# Patient Record
Sex: MALE | Race: WHITE | HISPANIC OR LATINO
[De-identification: names, ages, dates, MRNs, and addresses within clinical notes are randomized per-mention and may not be internally consistent; named-entity substitution may affect disease eponyms.]

---

## 2021-10-19 ENCOUNTER — APPOINTMENT (OUTPATIENT)
Dept: ORTHOPEDIC SURGERY | Facility: CLINIC | Age: 66
End: 2021-10-19
Payer: MEDICARE

## 2021-10-19 ENCOUNTER — NON-APPOINTMENT (OUTPATIENT)
Age: 66
End: 2021-10-19

## 2021-10-19 VITALS
HEART RATE: 56 BPM | SYSTOLIC BLOOD PRESSURE: 137 MMHG | BODY MASS INDEX: 44.95 KG/M2 | WEIGHT: 303.5 LBS | DIASTOLIC BLOOD PRESSURE: 74 MMHG | HEIGHT: 69 IN

## 2021-10-19 DIAGNOSIS — M25.569 PAIN IN UNSPECIFIED KNEE: ICD-10-CM

## 2021-10-19 DIAGNOSIS — Z82.79 FAMILY HISTORY OF OTHER CONGENITAL MALFORMATIONS, DEFORMATIONS AND CHROMOSOMAL ABNORMALITIES: ICD-10-CM

## 2021-10-19 DIAGNOSIS — Z82.49 FAMILY HISTORY OF ISCHEMIC HEART DISEASE AND OTHER DISEASES OF THE CIRCULATORY SYSTEM: ICD-10-CM

## 2021-10-19 DIAGNOSIS — M17.11 UNILATERAL PRIMARY OSTEOARTHRITIS, RIGHT KNEE: ICD-10-CM

## 2021-10-19 DIAGNOSIS — Z83.3 FAMILY HISTORY OF DIABETES MELLITUS: ICD-10-CM

## 2021-10-19 PROCEDURE — 99204 OFFICE O/P NEW MOD 45 MIN: CPT

## 2021-10-19 PROCEDURE — 72170 X-RAY EXAM OF PELVIS: CPT | Mod: 59

## 2021-10-19 PROCEDURE — 73562 X-RAY EXAM OF KNEE 3: CPT | Mod: 50

## 2022-01-12 ENCOUNTER — OUTPATIENT (OUTPATIENT)
Dept: OUTPATIENT SERVICES | Facility: HOSPITAL | Age: 67
LOS: 1 days | End: 2022-01-12
Payer: MEDICARE

## 2022-01-12 VITALS
HEIGHT: 69 IN | RESPIRATION RATE: 14 BRPM | TEMPERATURE: 98 F | SYSTOLIC BLOOD PRESSURE: 130 MMHG | WEIGHT: 304.9 LBS | OXYGEN SATURATION: 99 % | DIASTOLIC BLOOD PRESSURE: 88 MMHG | HEART RATE: 60 BPM

## 2022-01-12 DIAGNOSIS — M19.90 UNSPECIFIED OSTEOARTHRITIS, UNSPECIFIED SITE: ICD-10-CM

## 2022-01-12 DIAGNOSIS — M17.11 UNILATERAL PRIMARY OSTEOARTHRITIS, RIGHT KNEE: ICD-10-CM

## 2022-01-12 DIAGNOSIS — Z98.890 OTHER SPECIFIED POSTPROCEDURAL STATES: Chronic | ICD-10-CM

## 2022-01-12 LAB
A1C WITH ESTIMATED AVERAGE GLUCOSE RESULT: 5.9 % — HIGH (ref 4–5.6)
ALBUMIN SERPL ELPH-MCNC: 3.5 G/DL — SIGNIFICANT CHANGE UP (ref 3.3–5)
ALP SERPL-CCNC: 96 U/L — SIGNIFICANT CHANGE UP (ref 30–120)
ALT FLD-CCNC: 25 U/L DA — SIGNIFICANT CHANGE UP (ref 10–60)
ANION GAP SERPL CALC-SCNC: 7 MMOL/L — SIGNIFICANT CHANGE UP (ref 5–17)
APTT BLD: 39 SEC — HIGH (ref 27.5–35.5)
AST SERPL-CCNC: 24 U/L — SIGNIFICANT CHANGE UP (ref 10–40)
BILIRUB SERPL-MCNC: 0.4 MG/DL — SIGNIFICANT CHANGE UP (ref 0.2–1.2)
BLD GP AB SCN SERPL QL: SIGNIFICANT CHANGE UP
BUN SERPL-MCNC: 10 MG/DL — SIGNIFICANT CHANGE UP (ref 7–23)
CALCIUM SERPL-MCNC: 9 MG/DL — SIGNIFICANT CHANGE UP (ref 8.4–10.5)
CHLORIDE SERPL-SCNC: 105 MMOL/L — SIGNIFICANT CHANGE UP (ref 96–108)
CO2 SERPL-SCNC: 29 MMOL/L — SIGNIFICANT CHANGE UP (ref 22–31)
CREAT SERPL-MCNC: 0.92 MG/DL — SIGNIFICANT CHANGE UP (ref 0.5–1.3)
ESTIMATED AVERAGE GLUCOSE: 123 MG/DL — HIGH (ref 68–114)
GLUCOSE SERPL-MCNC: 95 MG/DL — SIGNIFICANT CHANGE UP (ref 70–99)
HCT VFR BLD CALC: 44.1 % — SIGNIFICANT CHANGE UP (ref 39–50)
HGB BLD-MCNC: 14 G/DL — SIGNIFICANT CHANGE UP (ref 13–17)
INR BLD: 1.08 RATIO — SIGNIFICANT CHANGE UP (ref 0.88–1.16)
MCHC RBC-ENTMCNC: 31.1 PG — SIGNIFICANT CHANGE UP (ref 27–34)
MCHC RBC-ENTMCNC: 31.7 GM/DL — LOW (ref 32–36)
MCV RBC AUTO: 98 FL — SIGNIFICANT CHANGE UP (ref 80–100)
MRSA PCR RESULT.: SIGNIFICANT CHANGE UP
NRBC # BLD: 0 /100 WBCS — SIGNIFICANT CHANGE UP (ref 0–0)
PLATELET # BLD AUTO: 262 K/UL — SIGNIFICANT CHANGE UP (ref 150–400)
POTASSIUM SERPL-MCNC: 4.7 MMOL/L — SIGNIFICANT CHANGE UP (ref 3.5–5.3)
POTASSIUM SERPL-SCNC: 4.7 MMOL/L — SIGNIFICANT CHANGE UP (ref 3.5–5.3)
PROT SERPL-MCNC: 7.7 G/DL — SIGNIFICANT CHANGE UP (ref 6–8.3)
PROTHROM AB SERPL-ACNC: 13 SEC — SIGNIFICANT CHANGE UP (ref 10.6–13.6)
RBC # BLD: 4.5 M/UL — SIGNIFICANT CHANGE UP (ref 4.2–5.8)
RBC # FLD: 12.4 % — SIGNIFICANT CHANGE UP (ref 10.3–14.5)
S AUREUS DNA NOSE QL NAA+PROBE: SIGNIFICANT CHANGE UP
SODIUM SERPL-SCNC: 141 MMOL/L — SIGNIFICANT CHANGE UP (ref 135–145)
WBC # BLD: 10.09 K/UL — SIGNIFICANT CHANGE UP (ref 3.8–10.5)
WBC # FLD AUTO: 10.09 K/UL — SIGNIFICANT CHANGE UP (ref 3.8–10.5)

## 2022-01-12 PROCEDURE — 93005 ELECTROCARDIOGRAM TRACING: CPT

## 2022-01-12 PROCEDURE — 93010 ELECTROCARDIOGRAM REPORT: CPT

## 2022-01-12 PROCEDURE — G0463: CPT

## 2022-01-12 NOTE — H&P PST ADULT - HISTORY OF PRESENT ILLNESS
This is a 67 y/o male whoi presents with complaint of right knee pain for the past 5 yearReports constant achy pain and pain get worse with walking , climbing up and down stairs , standing long time with pain scale 8/10 / X ray showed severe osteoarthritis . scheduled for right knee replacement on 1/24/22 This is a 67 y/o male who presents with complaint of right knee pain for the past 5 year. Reports constant achy pain and pain get worse with walking ,climbing up and down stairs , standing long time with pain scale 8/10 / X ray showed severe osteoarthritis . scheduled for right knee replacement on 1/24/22

## 2022-01-12 NOTE — H&P PST ADULT - NSICDXFAMILYHX_GEN_ALL_CORE_FT
FAMILY HISTORY:  Father  Still living? No  Family history of heart attack, Age at diagnosis: Age Unknown  FH: congestive heart failure, Age at diagnosis: Age Unknown    Mother  Still living? Yes, Estimated age: Age Unknown  FH: HTN (hypertension), Age at diagnosis: Age Unknown  FH: type 2 diabetes mellitus, Age at diagnosis: Age Unknown

## 2022-01-12 NOTE — H&P PST ADULT - PROBLEM SELECTOR PLAN 1
scheduled for right knee replacement on 1/24/22  medical and cardiac clearances   Pre op instructions   COVID test on 1/24/22 at 10 am

## 2022-01-12 NOTE — H&P PST ADULT - OPHTHALMOLOGIC
Physical Therapy     Referred by: Kristi Sawyer MD; Medical Diagnosis (from order):    Diagnosis Information      Diagnosis    434.91 (ICD-9-CM) - I63.50 (ICD-10-CM) - Right pontine stroke (CMS/HCC)    728.87 (ICD-9-CM) - R53.1 (ICD-10-CM) - Left-sided weakness    719.46, 338.29 (ICD-9-CM) - M25.562, G89.29 (ICD-10-CM) - Chronic pain of left knee                Daily Treatment Note    Visit:  3   Patient alert and oriented X3.    SUBJECTIVE                                                                                                               Via virtual , pt reports completing HEP   Pain / Symptoms:  Pain rating (out of 10): Current: 0 ; Best: 0; Worst: 6  Location: L knee  Quality / Description: ache.  Alleviating Factors: change in position, rest, over-the-counter medication.     OBJECTIVE                                                                                                                        TREATMENT                                                                                                                  Therapeutic Exercise:  NuStep x 7 min B UE/LE seat 3 Level 1    Supine heel slides,hip abduction,SLR x 10 reps each    -Alternating taps to colored dots on ground with uni UE support, x 15 reps each direction with LE, min verbal cuing for upright posture  -Tap ups to 6\" step with  Bilateral handrails x 2/15 reps each,forward/lateral cga for safety, mod cuing for for LE sequence  -Step ups to 6\" step with Bilateral handrails x 2/10 reps, cga provided for safety    Not performed today:  -seated heel raises,marching,laq's x 15 reps each    Therapeutic Activity:  -Sidestepping R/L x 6 feet each direction x 6 reps, min cuing for upright posture and increased step length  -Standing hip flexion x 15 reps with BUE support, min cuing for technique and posture    Step ups to Airex balance pad with BUE support x 15 reps, forward/lateral, min verbal cuing for foot placement and  technique    Gait Trainin feet x 2 rollator supervision for safety    Not performed today:  200 feet no device sba for safety, cga during turns due to LOB, min verbal cuing for increased heel strike     Skilled input: verbal instruction/cues, tactile instruction/cues and posture correction    Writer verbally educated and received verbal consent for hand placement, positioning of patient, and techniques to be performed today from patient for clothing adjustments for techniques, hand placement and palpation for techniques and therapist position for techniques as described above and how they are pertinent to the patient's plan of care.    Home Exercise Program/Education Materials: Access Code: [email or web address]  URL: [email or web address]  Date: 2021  Prepared by: AUDELIA BEAR    Exercises  •Seated Heel Raise - 1 x daily - 7 x weekly - 3 sets - 10 reps  •Seated March - 1 x daily - 7 x weekly - 3 sets - 10 reps  •Seated Long Arc Quad - 1 x daily - 7 x weekly - 3 sets - 10 reps    Access Code: [email or web address]  URL: [email or web address]  Date: 2021  Prepared by: AUDELIA FUENTESGER    Exercises  •Supine Heel Slide - 1 x daily - 7 x weekly - 1 sets - 10 reps  •Supine Hip Abduction - 1 x daily - 7 x weekly - 1 sets - 10 reps  •Supine Active Straight Leg Raise - 1 x daily - 7 x weekly - 1 sets - 10 reps         ASSESSMENT                                                                                                             Pt demonstrated improved endurance during planned interventions today. Will progress pt with dynamic balance and LE strengthening next session. Educated pt and pts mother on additional HEP exercises.  Pain/symptoms after session (out of 10): 4    Patient Education:   Results of above outlined education: Verbalizes understanding and Needs reinforcement      PLAN                                                                                                                            Suggestions for next session as indicated: Progress per plan of care         Therapy procedure time and total treatment time can be found documented on the Time Entry flowsheet   details…

## 2022-01-12 NOTE — H&P PST ADULT - MUSCULOSKELETAL
details… detailed exam right knee/no calf tenderness/decreased ROM due to pain/joint swelling/diminished strength

## 2022-01-12 NOTE — H&P PST ADULT - NSICDXPASTMEDICALHX_GEN_ALL_CORE_FT
PAST MEDICAL HISTORY:  2019 novel coronavirus disease (COVID-19) 7/2021    HLD (hyperlipidemia)     HTN (hypertension)     ALBERTO on CPAP     Osteoarthritis right knee

## 2022-01-18 PROBLEM — E78.5 HYPERLIPIDEMIA, UNSPECIFIED: Chronic | Status: ACTIVE | Noted: 2022-01-12

## 2022-01-18 PROBLEM — I10 ESSENTIAL (PRIMARY) HYPERTENSION: Chronic | Status: ACTIVE | Noted: 2022-01-12

## 2022-01-18 PROBLEM — M19.90 UNSPECIFIED OSTEOARTHRITIS, UNSPECIFIED SITE: Chronic | Status: ACTIVE | Noted: 2022-01-12

## 2022-01-18 PROBLEM — G47.33 OBSTRUCTIVE SLEEP APNEA (ADULT) (PEDIATRIC): Chronic | Status: ACTIVE | Noted: 2022-01-12

## 2022-01-21 ENCOUNTER — OUTPATIENT (OUTPATIENT)
Dept: OUTPATIENT SERVICES | Facility: HOSPITAL | Age: 67
LOS: 1 days | End: 2022-01-21
Payer: MEDICARE

## 2022-01-21 DIAGNOSIS — Z98.890 OTHER SPECIFIED POSTPROCEDURAL STATES: Chronic | ICD-10-CM

## 2022-01-21 DIAGNOSIS — Z20.828 CONTACT WITH AND (SUSPECTED) EXPOSURE TO OTHER VIRAL COMMUNICABLE DISEASES: ICD-10-CM

## 2022-01-21 LAB — SARS-COV-2 RNA SPEC QL NAA+PROBE: SIGNIFICANT CHANGE UP

## 2022-01-21 PROCEDURE — U0005: CPT

## 2022-01-21 PROCEDURE — U0003: CPT

## 2022-01-24 ENCOUNTER — APPOINTMENT (OUTPATIENT)
Dept: ORTHOPEDIC SURGERY | Facility: HOSPITAL | Age: 67
End: 2022-01-24

## 2022-02-07 ENCOUNTER — APPOINTMENT (OUTPATIENT)
Dept: ORTHOPEDIC SURGERY | Facility: CLINIC | Age: 67
End: 2022-02-07

## 2022-02-11 ENCOUNTER — OUTPATIENT (OUTPATIENT)
Dept: OUTPATIENT SERVICES | Facility: HOSPITAL | Age: 67
LOS: 1 days | End: 2022-02-11
Payer: MEDICARE

## 2022-02-11 VITALS
TEMPERATURE: 97 F | RESPIRATION RATE: 16 BRPM | HEART RATE: 62 BPM | OXYGEN SATURATION: 97 % | WEIGHT: 291.23 LBS | SYSTOLIC BLOOD PRESSURE: 168 MMHG | DIASTOLIC BLOOD PRESSURE: 78 MMHG | HEIGHT: 68.5 IN

## 2022-02-11 DIAGNOSIS — M17.11 UNILATERAL PRIMARY OSTEOARTHRITIS, RIGHT KNEE: ICD-10-CM

## 2022-02-11 DIAGNOSIS — Z98.890 OTHER SPECIFIED POSTPROCEDURAL STATES: Chronic | ICD-10-CM

## 2022-02-11 LAB
ALBUMIN SERPL ELPH-MCNC: 3.6 G/DL — SIGNIFICANT CHANGE UP (ref 3.3–5)
ALP SERPL-CCNC: 105 U/L — SIGNIFICANT CHANGE UP (ref 30–120)
ALT FLD-CCNC: 40 U/L DA — SIGNIFICANT CHANGE UP (ref 10–60)
ANION GAP SERPL CALC-SCNC: 7 MMOL/L — SIGNIFICANT CHANGE UP (ref 5–17)
APTT BLD: 37 SEC — HIGH (ref 27.5–35.5)
AST SERPL-CCNC: 28 U/L — SIGNIFICANT CHANGE UP (ref 10–40)
BILIRUB SERPL-MCNC: 0.4 MG/DL — SIGNIFICANT CHANGE UP (ref 0.2–1.2)
BLD GP AB SCN SERPL QL: SIGNIFICANT CHANGE UP
BUN SERPL-MCNC: 13 MG/DL — SIGNIFICANT CHANGE UP (ref 7–23)
CALCIUM SERPL-MCNC: 8.6 MG/DL — SIGNIFICANT CHANGE UP (ref 8.4–10.5)
CHLORIDE SERPL-SCNC: 104 MMOL/L — SIGNIFICANT CHANGE UP (ref 96–108)
CO2 SERPL-SCNC: 28 MMOL/L — SIGNIFICANT CHANGE UP (ref 22–31)
CREAT SERPL-MCNC: 1.03 MG/DL — SIGNIFICANT CHANGE UP (ref 0.5–1.3)
GLUCOSE SERPL-MCNC: 100 MG/DL — HIGH (ref 70–99)
HCT VFR BLD CALC: 44 % — SIGNIFICANT CHANGE UP (ref 39–50)
HGB BLD-MCNC: 14.4 G/DL — SIGNIFICANT CHANGE UP (ref 13–17)
INR BLD: 1.06 RATIO — SIGNIFICANT CHANGE UP (ref 0.88–1.16)
MCHC RBC-ENTMCNC: 31.9 PG — SIGNIFICANT CHANGE UP (ref 27–34)
MCHC RBC-ENTMCNC: 32.7 GM/DL — SIGNIFICANT CHANGE UP (ref 32–36)
MCV RBC AUTO: 97.6 FL — SIGNIFICANT CHANGE UP (ref 80–100)
MRSA PCR RESULT.: SIGNIFICANT CHANGE UP
NRBC # BLD: 0 /100 WBCS — SIGNIFICANT CHANGE UP (ref 0–0)
PLATELET # BLD AUTO: 272 K/UL — SIGNIFICANT CHANGE UP (ref 150–400)
POTASSIUM SERPL-MCNC: 4.9 MMOL/L — SIGNIFICANT CHANGE UP (ref 3.5–5.3)
POTASSIUM SERPL-SCNC: 4.9 MMOL/L — SIGNIFICANT CHANGE UP (ref 3.5–5.3)
PROT SERPL-MCNC: 7.9 G/DL — SIGNIFICANT CHANGE UP (ref 6–8.3)
PROTHROM AB SERPL-ACNC: 12.8 SEC — SIGNIFICANT CHANGE UP (ref 10.6–13.6)
RBC # BLD: 4.51 M/UL — SIGNIFICANT CHANGE UP (ref 4.2–5.8)
RBC # FLD: 12.5 % — SIGNIFICANT CHANGE UP (ref 10.3–14.5)
S AUREUS DNA NOSE QL NAA+PROBE: SIGNIFICANT CHANGE UP
SODIUM SERPL-SCNC: 139 MMOL/L — SIGNIFICANT CHANGE UP (ref 135–145)
WBC # BLD: 9.12 K/UL — SIGNIFICANT CHANGE UP (ref 3.8–10.5)
WBC # FLD AUTO: 9.12 K/UL — SIGNIFICANT CHANGE UP (ref 3.8–10.5)

## 2022-02-11 PROCEDURE — G0463: CPT

## 2022-02-11 RX ORDER — ATORVASTATIN CALCIUM 80 MG/1
1 TABLET, FILM COATED ORAL
Qty: 0 | Refills: 0 | DISCHARGE

## 2022-02-11 RX ORDER — NEBIVOLOL HYDROCHLORIDE 5 MG/1
1 TABLET ORAL
Qty: 0 | Refills: 0 | DISCHARGE

## 2022-02-11 NOTE — H&P PST ADULT - HISTORY OF PRESENT ILLNESS
This is a 65 y/o male who presents with complaints of right knee pain for the past 5 year. Reports constant achy pain and pain get worse with walking ,climbing up and down stairs. He received cortisone injections and "gel shots" in the past with no relief. Currently pain is 3/10 at rest and increased to 8-9/10 with activity. Denies using any current pain relief measures.

## 2022-02-11 NOTE — H&P PST ADULT - NSICDXPASTMEDICALHX_GEN_ALL_CORE_FT
PAST MEDICAL HISTORY:  2019 novel coronavirus disease (COVID-19) November 2021, mild symptoms and no hospitalization    Childhood asthma     COVID-19 vaccine series completed     GERD (gastroesophageal reflux disease) diet controlled    HLD (hyperlipidemia)     HTN (hypertension)     ALBERTO on CPAP     Osteoarthritis right knee

## 2022-02-11 NOTE — H&P PST ADULT - MUSCULOSKELETAL
right knee/no calf tenderness/decreased ROM due to pain/joint swelling/diminished strength details… detailed exam right knee/no calf tenderness/decreased ROM/decreased ROM due to pain/joint swelling/diminished strength

## 2022-02-11 NOTE — H&P PST ADULT - FALL HARM RISK - UNIVERSAL INTERVENTIONS
Bed in lowest position, wheels locked, appropriate side rails in place/Call bell, personal items and telephone in reach/Non-slip footwear when patient is out of bed/Shaw to call system/Physically safe environment - no spills, clutter or unnecessary equipment/Purposeful Proactive Rounding/Room/bathroom lighting operational, light cord in reach

## 2022-02-11 NOTE — H&P PST ADULT - RS GEN PE MLT RESP DETAILS PC
breath sounds equal/respirations non-labored/clear to auscultation bilaterally/no rhonchi breath sounds equal/respirations non-labored/clear to auscultation bilaterally/no rales/no rhonchi/no wheezes

## 2022-02-11 NOTE — H&P PST ADULT - PROBLEM SELECTOR PLAN 1
scheduled for right knee replacement on 1/24/22  medical and cardiac clearances   Pre op instructions   COVID test on 1/24/22 at 10 am right TKR. medical, cardiac and pulmonary clearances requested. obtain stress and echo reports. ERAS and surgical wash instructions reviewed and verbalized understanding. covid PCR appt. confirmed for 2/15/22 at 0900

## 2022-02-14 DIAGNOSIS — R79.1 ABNORMAL COAGULATION PROFILE: ICD-10-CM

## 2022-02-15 ENCOUNTER — OUTPATIENT (OUTPATIENT)
Dept: OUTPATIENT SERVICES | Facility: HOSPITAL | Age: 67
LOS: 1 days | End: 2022-02-15

## 2022-02-15 DIAGNOSIS — Z20.828 CONTACT WITH AND (SUSPECTED) EXPOSURE TO OTHER VIRAL COMMUNICABLE DISEASES: ICD-10-CM

## 2022-02-15 DIAGNOSIS — Z98.890 OTHER SPECIFIED POSTPROCEDURAL STATES: Chronic | ICD-10-CM

## 2022-02-15 LAB — SARS-COV-2 RNA SPEC QL NAA+PROBE: SIGNIFICANT CHANGE UP

## 2022-02-17 ENCOUNTER — INPATIENT (INPATIENT)
Facility: HOSPITAL | Age: 67
LOS: 0 days | Discharge: HOME HEALTH | DRG: 470 | End: 2022-02-18
Attending: ORTHOPAEDIC SURGERY | Admitting: ORTHOPAEDIC SURGERY
Payer: MEDICARE

## 2022-02-17 ENCOUNTER — RESULT REVIEW (OUTPATIENT)
Age: 67
End: 2022-02-17

## 2022-02-17 ENCOUNTER — APPOINTMENT (OUTPATIENT)
Dept: ORTHOPEDIC SURGERY | Facility: HOSPITAL | Age: 67
End: 2022-02-17

## 2022-02-17 ENCOUNTER — TRANSCRIPTION ENCOUNTER (OUTPATIENT)
Age: 67
End: 2022-02-17

## 2022-02-17 VITALS
RESPIRATION RATE: 14 BRPM | WEIGHT: 292.55 LBS | TEMPERATURE: 98 F | DIASTOLIC BLOOD PRESSURE: 49 MMHG | HEART RATE: 63 BPM | OXYGEN SATURATION: 100 % | HEIGHT: 69 IN | SYSTOLIC BLOOD PRESSURE: 139 MMHG

## 2022-02-17 DIAGNOSIS — Z98.890 OTHER SPECIFIED POSTPROCEDURAL STATES: Chronic | ICD-10-CM

## 2022-02-17 DIAGNOSIS — M17.11 UNILATERAL PRIMARY OSTEOARTHRITIS, RIGHT KNEE: ICD-10-CM

## 2022-02-17 PROCEDURE — 88305 TISSUE EXAM BY PATHOLOGIST: CPT | Mod: 26

## 2022-02-17 PROCEDURE — 27447 TOTAL KNEE ARTHROPLASTY: CPT | Mod: RT

## 2022-02-17 PROCEDURE — 88311 DECALCIFY TISSUE: CPT | Mod: 26

## 2022-02-17 PROCEDURE — 99222 1ST HOSP IP/OBS MODERATE 55: CPT

## 2022-02-17 PROCEDURE — 73560 X-RAY EXAM OF KNEE 1 OR 2: CPT | Mod: 26,RT

## 2022-02-17 DEVICE — CEMENT BONE COBALT G-HV: Type: IMPLANTABLE DEVICE | Site: RIGHT | Status: FUNCTIONAL

## 2022-02-17 DEVICE — IMPLANTABLE DEVICE: Type: IMPLANTABLE DEVICE | Site: RIGHT | Status: FUNCTIONAL

## 2022-02-17 DEVICE — COMP FEM NON POROUS SZ 7 RT: Type: IMPLANTABLE DEVICE | Site: RIGHT | Status: FUNCTIONAL

## 2022-02-17 DEVICE — INSERT TIB NONPOROUS UNIV SZ  8 RT: Type: IMPLANTABLE DEVICE | Site: RIGHT | Status: FUNCTIONAL

## 2022-02-17 DEVICE — STEM MOD UNIV TIB 12X40MM: Type: IMPLANTABLE DEVICE | Site: RIGHT | Status: FUNCTIONAL

## 2022-02-17 DEVICE — PIN THREADED HEADED STRL: Type: IMPLANTABLE DEVICE | Site: RIGHT | Status: FUNCTIONAL

## 2022-02-17 DEVICE — COMP PATELLA TRI-PEG E-PLUS POLY 9X35MM: Type: IMPLANTABLE DEVICE | Site: RIGHT | Status: FUNCTIONAL

## 2022-02-17 DEVICE — INSERT TIB EMPOWR SZ 8 12MM: Type: IMPLANTABLE DEVICE | Site: RIGHT | Status: FUNCTIONAL

## 2022-02-17 DEVICE — BONE WAX 2.5GM: Type: IMPLANTABLE DEVICE | Site: RIGHT | Status: FUNCTIONAL

## 2022-02-17 RX ORDER — ONDANSETRON 8 MG/1
4 TABLET, FILM COATED ORAL ONCE
Refills: 0 | Status: ACTIVE | OUTPATIENT
Start: 2022-02-17 | End: 2023-01-16

## 2022-02-17 RX ORDER — SODIUM CHLORIDE 9 MG/ML
500 INJECTION INTRAMUSCULAR; INTRAVENOUS; SUBCUTANEOUS ONCE
Refills: 0 | Status: COMPLETED | OUTPATIENT
Start: 2022-02-17 | End: 2022-02-17

## 2022-02-17 RX ORDER — CHLORHEXIDINE GLUCONATE 213 G/1000ML
1 SOLUTION TOPICAL ONCE
Refills: 0 | Status: COMPLETED | OUTPATIENT
Start: 2022-02-17 | End: 2022-02-17

## 2022-02-17 RX ORDER — CEFAZOLIN SODIUM 1 G
3000 VIAL (EA) INJECTION ONCE
Refills: 0 | Status: COMPLETED | OUTPATIENT
Start: 2022-02-17 | End: 2022-02-17

## 2022-02-17 RX ORDER — ATORVASTATIN CALCIUM 80 MG/1
1 TABLET, FILM COATED ORAL
Qty: 0 | Refills: 0 | DISCHARGE

## 2022-02-17 RX ORDER — MAGNESIUM HYDROXIDE 400 MG/1
30 TABLET, CHEWABLE ORAL DAILY
Refills: 0 | Status: DISCONTINUED | OUTPATIENT
Start: 2022-02-17 | End: 2022-02-18

## 2022-02-17 RX ORDER — POLYETHYLENE GLYCOL 3350 17 G/17G
17 POWDER, FOR SOLUTION ORAL AT BEDTIME
Refills: 0 | Status: DISCONTINUED | OUTPATIENT
Start: 2022-02-17 | End: 2022-02-18

## 2022-02-17 RX ORDER — APREPITANT 80 MG/1
40 CAPSULE ORAL ONCE
Refills: 0 | Status: COMPLETED | OUTPATIENT
Start: 2022-02-17 | End: 2022-02-17

## 2022-02-17 RX ORDER — DEXAMETHASONE 0.5 MG/5ML
8 ELIXIR ORAL ONCE
Refills: 0 | Status: COMPLETED | OUTPATIENT
Start: 2022-02-18 | End: 2022-02-18

## 2022-02-17 RX ORDER — OXYCODONE HYDROCHLORIDE 5 MG/1
10 TABLET ORAL
Refills: 0 | Status: DISCONTINUED | OUTPATIENT
Start: 2022-02-17 | End: 2022-02-18

## 2022-02-17 RX ORDER — ACETAMINOPHEN 500 MG
1000 TABLET ORAL ONCE
Refills: 0 | Status: COMPLETED | OUTPATIENT
Start: 2022-02-18 | End: 2022-02-18

## 2022-02-17 RX ORDER — CELECOXIB 200 MG/1
200 CAPSULE ORAL EVERY 12 HOURS
Refills: 0 | Status: DISCONTINUED | OUTPATIENT
Start: 2022-02-18 | End: 2022-02-18

## 2022-02-17 RX ORDER — ACETAMINOPHEN 500 MG
1000 TABLET ORAL EVERY 8 HOURS
Refills: 0 | Status: DISCONTINUED | OUTPATIENT
Start: 2022-02-18 | End: 2022-02-18

## 2022-02-17 RX ORDER — SODIUM CHLORIDE 9 MG/ML
1000 INJECTION, SOLUTION INTRAVENOUS
Refills: 0 | Status: DISCONTINUED | OUTPATIENT
Start: 2022-02-17 | End: 2022-02-18

## 2022-02-17 RX ORDER — TRANEXAMIC ACID 100 MG/ML
1000 INJECTION, SOLUTION INTRAVENOUS ONCE
Refills: 0 | Status: COMPLETED | OUTPATIENT
Start: 2022-02-17 | End: 2022-02-17

## 2022-02-17 RX ORDER — ACETAMINOPHEN 500 MG
1000 TABLET ORAL EVERY 8 HOURS
Refills: 0 | Status: COMPLETED | OUTPATIENT
Start: 2022-02-18 | End: 2023-01-17

## 2022-02-17 RX ORDER — SENNA PLUS 8.6 MG/1
2 TABLET ORAL AT BEDTIME
Refills: 0 | Status: DISCONTINUED | OUTPATIENT
Start: 2022-02-17 | End: 2022-02-18

## 2022-02-17 RX ORDER — NEBIVOLOL HYDROCHLORIDE 5 MG/1
10 TABLET ORAL DAILY
Refills: 0 | Status: DISCONTINUED | OUTPATIENT
Start: 2022-02-17 | End: 2022-02-18

## 2022-02-17 RX ORDER — HYDROMORPHONE HYDROCHLORIDE 2 MG/ML
0.5 INJECTION INTRAMUSCULAR; INTRAVENOUS; SUBCUTANEOUS
Refills: 0 | Status: DISCONTINUED | OUTPATIENT
Start: 2022-02-17 | End: 2022-02-18

## 2022-02-17 RX ORDER — SODIUM CHLORIDE 9 MG/ML
500 INJECTION INTRAMUSCULAR; INTRAVENOUS; SUBCUTANEOUS ONCE
Refills: 0 | Status: ACTIVE | OUTPATIENT
Start: 2022-02-17

## 2022-02-17 RX ORDER — APIXABAN 2.5 MG/1
2.5 TABLET, FILM COATED ORAL EVERY 12 HOURS
Refills: 0 | Status: DISCONTINUED | OUTPATIENT
Start: 2022-02-18 | End: 2022-02-18

## 2022-02-17 RX ORDER — ONDANSETRON 8 MG/1
4 TABLET, FILM COATED ORAL EVERY 6 HOURS
Refills: 0 | Status: DISCONTINUED | OUTPATIENT
Start: 2022-02-17 | End: 2022-02-18

## 2022-02-17 RX ORDER — ATORVASTATIN CALCIUM 80 MG/1
40 TABLET, FILM COATED ORAL AT BEDTIME
Refills: 0 | Status: DISCONTINUED | OUTPATIENT
Start: 2022-02-17 | End: 2022-02-18

## 2022-02-17 RX ORDER — HYDROMORPHONE HYDROCHLORIDE 2 MG/ML
0.5 INJECTION INTRAMUSCULAR; INTRAVENOUS; SUBCUTANEOUS
Refills: 0 | Status: ACTIVE | OUTPATIENT
Start: 2022-02-17 | End: 2022-02-24

## 2022-02-17 RX ORDER — PANTOPRAZOLE SODIUM 20 MG/1
40 TABLET, DELAYED RELEASE ORAL
Refills: 0 | Status: DISCONTINUED | OUTPATIENT
Start: 2022-02-17 | End: 2022-02-18

## 2022-02-17 RX ORDER — NEBIVOLOL HYDROCHLORIDE 5 MG/1
1 TABLET ORAL
Qty: 0 | Refills: 0 | DISCHARGE

## 2022-02-17 RX ORDER — ACETAMINOPHEN 500 MG
1000 TABLET ORAL ONCE
Refills: 0 | Status: COMPLETED | OUTPATIENT
Start: 2022-02-17 | End: 2022-02-17

## 2022-02-17 RX ORDER — OXYCODONE HYDROCHLORIDE 5 MG/1
5 TABLET ORAL
Refills: 0 | Status: DISCONTINUED | OUTPATIENT
Start: 2022-02-17 | End: 2022-02-18

## 2022-02-17 RX ORDER — SODIUM CHLORIDE 9 MG/ML
1000 INJECTION, SOLUTION INTRAVENOUS
Refills: 0 | Status: ACTIVE | OUTPATIENT
Start: 2022-02-17 | End: 2023-01-16

## 2022-02-17 RX ADMIN — Medication 400 MILLIGRAM(S): at 18:37

## 2022-02-17 RX ADMIN — NEBIVOLOL HYDROCHLORIDE 10 MILLIGRAM(S): 5 TABLET ORAL at 21:11

## 2022-02-17 RX ADMIN — SODIUM CHLORIDE 125 MILLILITER(S): 9 INJECTION, SOLUTION INTRAVENOUS at 20:40

## 2022-02-17 RX ADMIN — Medication 1000 MILLIGRAM(S): at 18:56

## 2022-02-17 RX ADMIN — ATORVASTATIN CALCIUM 40 MILLIGRAM(S): 80 TABLET, FILM COATED ORAL at 21:11

## 2022-02-17 RX ADMIN — APREPITANT 40 MILLIGRAM(S): 80 CAPSULE ORAL at 09:35

## 2022-02-17 RX ADMIN — Medication 200 MILLIGRAM(S): at 20:41

## 2022-02-17 RX ADMIN — SODIUM CHLORIDE 75 MILLILITER(S): 9 INJECTION, SOLUTION INTRAVENOUS at 14:44

## 2022-02-17 RX ADMIN — SODIUM CHLORIDE 500 MILLILITER(S): 9 INJECTION INTRAMUSCULAR; INTRAVENOUS; SUBCUTANEOUS at 14:45

## 2022-02-17 RX ADMIN — CHLORHEXIDINE GLUCONATE 1 APPLICATION(S): 213 SOLUTION TOPICAL at 09:35

## 2022-02-17 NOTE — PHYSICAL THERAPY INITIAL EVALUATION ADULT - IMPAIRED TRANSFERS: SIT/STAND, REHAB EVAL
impaired balance/impaired coordination/impaired motor control/narrow base of support/decreased ROM/decreased strength

## 2022-02-17 NOTE — DISCHARGE NOTE PROVIDER - HOSPITAL COURSE
This patient was admitted to [facility] with a history of severe degenerative joint disease of the right knee  Patient went to Pre-Surgical Testing at [facility] and was medically cleared to undergo elective procedure. Patient underwent Right TKR by  on 2/17/2022.  No operative or neelam-operative complications arose during patients hospital course.  Patient received antibiotic according to SCIP guidelines for infection prevention.  Eliquis was given for DVT prophylaxis.  Anesthesia, Medical Hospitalist, Physical Therapy and Occupational Therapy were consulted. Patient is stable for discharge with a good prognosis.  Appropriate discharge instructions and medications are provided in this document.   This patient was admitted to [facility] with a history of severe degenerative joint disease of the right knee  Patient went to Pre-Surgical Testing at [facility] and was medically cleared to undergo elective procedure. Patient underwent Right TKR by  on 2/17/2022.  No operative or neelam-operative complications arose during patients hospital course.  Patient received antibiotic according to SCIP guidelines for infection prevention.  Eliquis 2.5mg every 12 hrs, along w/ bilat venodynes was given for DVT prophylaxis.  Anesthesia, Medical Hospitalist, Physical Therapy and Occupational Therapy were consulted. Patient is stable for discharge with a good prognosis.  Appropriate discharge instructions and medications are provided in this document.

## 2022-02-17 NOTE — DISCHARGE NOTE PROVIDER - NSDCFUADDINST_GEN_ALL_CORE_FT
For Constipation :   • Increase your water intake. Drink at least 8 glasses of water daily.  • Try adding fiber to your diet by eating fruits, vegetables and foods that are rich in grains.  • If you do experience constipation, you may take an over-the-counter stool softener/laxative such as Kennedi Colace, Senekot or  Milk of Magnesia.  - Call your doctor if you experience:  • An increase in pain not controlled by pain medication or change in activity or  position.  • Temperature greater than 101° F.  • Redness, increased swelling or foul smelling drainage from or around the  incision.  • Numbness, tingling or a change in color or temperature of the operative leg.  • Call your doctor immediately if you experience chest pain, shortness of breath or calf pain.   - Call your doctor if you experience:  • An increase in pain not controlled by pain medication or change in activity or  position.  • Temperature greater than 101° F.  • Redness, increased swelling or foul smelling drainage from or around the  incision.  • Numbness, tingling or a change in color or temperature of the operative leg.  • Call your doctor immediately if you experience chest pain, shortness of breath or calf pain.

## 2022-02-17 NOTE — PATIENT PROFILE ADULT - FALL HARM RISK - HARM RISK INTERVENTIONS

## 2022-02-17 NOTE — PHYSICAL THERAPY INITIAL EVALUATION ADULT - PLANNED THERAPY INTERVENTIONS, PT EVAL
Pt will be able to negotiate 10 steps with cane independently in 1-2 days./bed mobility training/gait training/transfer training

## 2022-02-17 NOTE — DISCHARGE NOTE PROVIDER - NSDCFUADDAPPT_GEN_ALL_CORE_FT
- Call your doctor if you experience:  • An increase in pain not controlled by pain medication or change in activity or  position.  • Temperature greater than 101° F.  • Redness, increased swelling or foul smelling drainage from or around the  incision.  • Numbness, tingling or a change in color or temperature of the operative leg.  • Call your doctor immediately if you experience chest pain, shortness of breath or calf pain.   follow up Dr. Perez office as directed call office to confirm appt

## 2022-02-17 NOTE — DISCHARGE NOTE PROVIDER - INSTRUCTIONS
For Constipation :   • Increase your water intake. Drink at least 8 glasses of water daily.  • Try adding fiber to your diet by eating fruits, vegetables and foods that are rich in grains.  • If you do experience constipation, you may take an over-the-counter stool softener/laxative such as Kennedi Colace, Senekot, miralax or  Milk of Magnesia.

## 2022-02-17 NOTE — CONSULT NOTE ADULT - ASSESSMENT
S/P R TKR    Aftercare following surgery  -WBAT  -PT/OT  -Early ambulation  -Pain management  -Incentive Spirometry  -DVT ppx as per ortho    HTN/HLD  Continue statin  BP meds per ortho protocol     Thank you for allowing us to participate in the care of this patient. Our team will continue to follow along with you. Further recommendations to follow pending the clinical course.
pt with TKR    OP  OA  Obesity  GERD  HTN    uses CPAP night time for ALBERTO  CPAP night time - Sleep Hygiene - Weight management  post op care  Antacid  I michael  dvt p  pain rx regimen  caution with Opioids in pt with ALBERTO  Bowel rx regimen  PT eval  ortho f.u.  carlos planning  wound care

## 2022-02-17 NOTE — DISCHARGE NOTE PROVIDER - NSDCMRMEDTOKEN_GEN_ALL_CORE_FT
atorvastatin 40 mg oral tablet: 1 tab(s) orally once a day (at bedtime)  Bystolic 10 mg oral tablet: 1 tab(s) orally once a day (at bedtime)   acetaminophen 500 mg oral tablet: 2 tab(s) orally every 8 hours  apixaban 2.5 mg oral tablet: 1 tab(s) orally every 12 hours MDD:s/p total knee  atorvastatin 40 mg oral tablet: 1 tab(s) orally once a day (at bedtime)  Bystolic 10 mg oral tablet: 1 tab(s) orally once a day (at bedtime)  celecoxib 200 mg oral capsule: 1 cap(s) orally every 12 hours MDD:2  omeprazole 20 mg oral delayed release capsule: 1 cap(s) orally once a day MDD:1  oxyCODONE 5 mg oral tablet: 1 tab(s) orally every 4 hours, As Needed MDD:6 for moderate pain 2  tablets for severe pain  senna oral tablet: 2 tab(s) orally once a day (at bedtime)   acetaminophen 500 mg oral tablet: 2 tab(s) orally every 8 hours  apixaban 2.5 mg oral tablet: 1 tab(s) orally every 12 hours MDD:s/p total knee  atorvastatin 40 mg oral tablet: 1 tab(s) orally once a day (at bedtime)  Bystolic 10 mg oral tablet: 1 tab(s) orally once a day (at bedtime)  celecoxib 200 mg oral capsule: 1 cap(s) orally every 12 hours MDD:2  omeprazole 20 mg oral delayed release capsule: 1 cap(s) orally once a day MDD:1  oxyCODONE 5 mg oral tablet: 1 tab(s) orally every 4 hours, As Needed MDD:6 for moderate pain 2  tablets for severe pain  polyethylene glycol 3350 oral powder for reconstitution: 17 gram(s) orally once a day (at bedtime)  senna oral tablet: 2 tab(s) orally once a day (at bedtime)   acetaminophen 500 mg oral tablet: 2 tab(s) orally every 8 hours  apixaban 2.5 mg oral tablet: 1 tab(s) orally every 12 hours MDD:s/p total knee  aspirin 81 mg oral delayed release tablet: 1 tab(s) orally every 12 hours .  Start after eliquis course is complete and 2 hrs before celebrex  atorvastatin 40 mg oral tablet: 1 tab(s) orally once a day (at bedtime)  Bystolic 10 mg oral tablet: 1 tab(s) orally once a day (at bedtime)  celecoxib 200 mg oral capsule: 1 cap(s) orally every 12 hours MDD:2  omeprazole 20 mg oral delayed release capsule: 1 cap(s) orally once a day MDD:1  oxyCODONE 5 mg oral tablet: 1 tab(s) orally every 4 hours, As Needed MDD:6 for moderate pain 2  tablets for severe pain  polyethylene glycol 3350 oral powder for reconstitution: 17 gram(s) orally once a day (at bedtime)  senna oral tablet: 2 tab(s) orally once a day (at bedtime)   acetaminophen 500 mg oral tablet: 2 tab(s) orally every 8 hours  apixaban 2.5 mg oral tablet: 1 tab(s) orally every 12 hours MDD:s/p total knee  aspirin 81 mg oral delayed release tablet: 1 tab(s) orally every 12 hours .  Start after eliquis course is complete and 2 hrs before celebrex  atorvastatin 40 mg oral tablet: 1 tab(s) orally once a day (at bedtime)  Bystolic 10 mg oral tablet: 1 tab(s) orally once a day (at bedtime)  celecoxib 200 mg oral capsule: 1 cap(s) orally every 12 hours MDD:2  Eliquis 2.5 mg oral tablet: 1 tab(s) orally 2 times a day MDD:2  omeprazole 20 mg oral delayed release capsule: 1 cap(s) orally once a day MDD:1  oxyCODONE 5 mg oral tablet: 1 tab(s) orally every 4 hours, As Needed MDD:6 for moderate pain 2  tablets for severe pain  polyethylene glycol 3350 oral powder for reconstitution: 17 gram(s) orally once a day (at bedtime)  senna oral tablet: 2 tab(s) orally once a day (at bedtime)

## 2022-02-17 NOTE — DISCHARGE NOTE PROVIDER - CARE PROVIDER_API CALL
Milena Perez)  Orthopedics  [address], [address]  [place] [address]  Phone: [phone]  Fax: [phone]  Scheduled Appointment: 03/04/2022 09:00 AM

## 2022-02-17 NOTE — PHYSICAL THERAPY INITIAL EVALUATION ADULT - PERTINENT HX OF CURRENT PROBLEM, REHAB EVAL
This is a 67 y/o male who presents with complaint of right knee pain for the past 5 year. Reports constant achy pain and pain get worse with walking ,climbing up and down stairs , standing long time with pain scale 8/10 / X ray showed severe osteoarthritis . scheduled for right knee replacement on 1/24/22

## 2022-02-17 NOTE — PHYSICAL THERAPY INITIAL EVALUATION ADULT - ADDITIONAL COMMENTS
Pt lives in private home with 4 DORIAN w/ handrail and full flight of steps inside. Has room on main level if needed. Pt was fully independent prior to surgery. Has RW at home.

## 2022-02-17 NOTE — DISCHARGE NOTE PROVIDER - NSDCFUSCHEDAPPT_GEN_ALL_CORE_FT
REESE MENDEZ ; 03/04/2022 ; \[facility]\""[address]  REESE MENDEZ ; 04/19/2022 ; [address]

## 2022-02-17 NOTE — PHYSICAL THERAPY INITIAL EVALUATION ADULT - GAIT DEVIATIONS NOTED, PT EVAL
increased time in double stance/decreased velocity of limb motion/decreased step length/decreased stride length/decreased weight-shifting ability

## 2022-02-17 NOTE — DISCHARGE NOTE PROVIDER - NSDCACTIVITY_GEN_ALL_CORE
No restrictions/Do not drive or operate machinery/Showering allowed/Do not make important decisions/Stairs allowed/Walking - Indoors allowed/No heavy lifting/straining/Follow Instructions Provided by your Surgical Team Do not drive or operate machinery/Showering allowed/Stairs allowed/Walking - Indoors allowed/No heavy lifting/straining/Walking - Outdoors allowed/Follow Instructions Provided by your Surgical Team

## 2022-02-17 NOTE — DISCHARGE NOTE PROVIDER - NSDCCPCAREPLAN_GEN_ALL_CORE_FT
PRINCIPAL DISCHARGE DIAGNOSIS  Diagnosis: Osteoarthritis of right knee  Assessment and Plan of Treatment: Your new total knee requires proper care.  Your care provider is your best resource for care information.  Please follow these basic guidelines.  Use pain medication if needed as prescribed.  Ice packs are a helpful addition to your comfort.  Applying a  waterproof ice 20 minutes on alternating with 20 minutes off for 48 hours post op pack over a cloth or thin towel to the site for 30 minutes per hour may provide benefit by reducing swelling and discomfort.  Your Physical Therapy/Occupational Therapy may include ambulation, transfers, stairs, ADL's (activities of daily living), range of motion exercises, and isometrics.  Your participation is vital to your recovery.  -Your Activity  • Weight Bearing as tolerated with rolling walker.  • Take short, frequent walks increasing the distance that you walk each day as tolerated.  • Change your position every hour to decrease pain and stiffness.  • Continue the exercises taught to you by your physical therapist.  • No driving until cleared by the doctor.  • No tub baths, hot tubs, or swimming pools until instructed by your doctor.  • Do not squat down on the floor.  • Do not kneel or twist your knee.  Keep incision clean and dry.  Salves, ointments or other topical treatments are not to be used on the wound unless sepcifically recommended by your doctor.   If you have sutures (stiches) and no drainage form the incision you may shower allowing water to rinse the incision and pat it  dry afterward with a clean towel.  If you have Prineo (tape/glue) you may shower and pat the wound dry.  Prineo removal is done in the office 2 weeks after surgery.  If you have further questions or problems call your doctor's office or go to the emergency room.         PRINCIPAL DISCHARGE DIAGNOSIS  Diagnosis: Osteoarthritis of right knee  Assessment and Plan of Treatment: Your new total knee requires proper care.  Your care provider is your best resource for care information.  Please follow these basic guidelines.  Use pain medication if needed as prescribed.  Ice packs are a helpful addition to your comfort.  Applying a  waterproof ice 20 minutes on alternating with 20 minutes off for 48 hours post op pack over a cloth or thin towel to the site for 30 minutes per hour may provide benefit by reducing swelling and discomfort.  Your Physical Therapy/Occupational Therapy may include ambulation, transfers, stairs, ADL's (activities of daily living), range of motion exercises, and isometrics.  Your participation is vital to your recovery.  -Your Activity  • Weight Bearing as tolerated with rolling walker.  • Take short, frequent walks increasing the distance that you walk each day as tolerated.  • Change your position every hour to decrease pain and stiffness.  • Continue the exercises taught to you by your physical therapist.  • No driving until cleared by the doctor.  • No tub baths, hot tubs, or swimming pools until instructed by your doctor.  • Do not squat down on the floor.  • Do not kneel or twist your knee.  Keep incision clean and dry.  Salves, ointments or other topical treatments are not to be used on the wound unless sepcifically recommended by your doctor.  You have a MIHIR dressing. You may shower. Disconnect MIHIR battery prior to showering. Reconnect battery after showering and press orange button to resume MIHIR power. Remove MIHIR dressing on post-op day #7.  Keep incision clean. DO NOT APPLY ANYTHING to incision site (salves/ointments/creams). Do not scrub incision site. Pat dry after shower.   Prineo was used for closure.  You may shower and pat the wound dry.  Prineo removal is done in the office 2 weeks after surgery.  If you have further questions or problems call your doctor's office or go to the emergency room.         PRINCIPAL DISCHARGE DIAGNOSIS  Diagnosis: Osteoarthritis of right knee  Assessment and Plan of Treatment: Your new total knee requires proper care.  Your care provider is your best resource for care information.  Please follow these basic guidelines.  Use pain medication if needed as prescribed.  Ice packs are a helpful addition to your comfort.  Applying a  waterproof ice 20 minutes on alternating with 20 minutes off for 48 hours post op pack over a cloth or thin towel to the site for 30 minutes per hour may provide benefit by reducing swelling and discomfort.  Your Physical Therapy/Occupational Therapy may include ambulation, transfers, stairs, ADL's (activities of daily living), range of motion exercises, and isometrics.  Your participation is vital to your recovery.  -Your Activity  • Weight Bearing as tolerated with rolling walker.  • Take short, frequent walks increasing the distance that you walk each day as tolerated.  • Change your position every hour to decrease pain and stiffness.  • Continue the exercises taught to you by your physical therapist.  • No driving until cleared by the doctor.  • No tub baths, hot tubs, or swimming pools until instructed by your doctor.  • Do not squat down on the floor.  • Do not kneel or twist your knee.  Keep incision clean and dry.  Salves, ointments or other topical treatments are not to be used on the wound unless sepcifically recommended by your doctor.  You have a MIHIR dressing. You may shower. Disconnect MIHIR battery prior to showering. Reconnect battery after showering and press orange button to resume MIHIR power. Remove MIHIR dressing on post-op day #7.  Keep incision clean. DO NOT APPLY ANYTHING to incision site (salves/ointments/creams). Do not scrub incision site. Pat dry after shower.  Prineo was used for closure.  You may shower and pat the wound dry.  Prineo removal is done in the office 2 weeks after surgery.  Use cryocuff for 20 min sessions w/ at least 1 hr between sessions.  Use ace wrap or towel under cuff; don't place cuff directly on skin

## 2022-02-17 NOTE — CONSULT NOTE ADULT - TIME BILLING
The total amount of time listed was spent reviewing the hospital notes, laboratory values, imaging findings, assessing/counseling the patient, discussing with nursing staff.

## 2022-02-18 ENCOUNTER — TRANSCRIPTION ENCOUNTER (OUTPATIENT)
Age: 67
End: 2022-02-18

## 2022-02-18 VITALS
OXYGEN SATURATION: 94 % | SYSTOLIC BLOOD PRESSURE: 116 MMHG | DIASTOLIC BLOOD PRESSURE: 59 MMHG | RESPIRATION RATE: 18 BRPM | TEMPERATURE: 98 F | HEART RATE: 58 BPM

## 2022-02-18 LAB
ANION GAP SERPL CALC-SCNC: 10 MMOL/L — SIGNIFICANT CHANGE UP (ref 5–17)
BUN SERPL-MCNC: 15 MG/DL — SIGNIFICANT CHANGE UP (ref 7–23)
CALCIUM SERPL-MCNC: 8.3 MG/DL — LOW (ref 8.4–10.5)
CHLORIDE SERPL-SCNC: 103 MMOL/L — SIGNIFICANT CHANGE UP (ref 96–108)
CO2 SERPL-SCNC: 24 MMOL/L — SIGNIFICANT CHANGE UP (ref 22–31)
CREAT SERPL-MCNC: 1.11 MG/DL — SIGNIFICANT CHANGE UP (ref 0.5–1.3)
GLUCOSE SERPL-MCNC: 123 MG/DL — HIGH (ref 70–99)
HCT VFR BLD CALC: 36.6 % — LOW (ref 39–50)
HGB BLD-MCNC: 11.9 G/DL — LOW (ref 13–17)
MAGNESIUM SERPL-MCNC: 2 MG/DL — SIGNIFICANT CHANGE UP (ref 1.6–2.6)
MCHC RBC-ENTMCNC: 32 PG — SIGNIFICANT CHANGE UP (ref 27–34)
MCHC RBC-ENTMCNC: 32.5 GM/DL — SIGNIFICANT CHANGE UP (ref 32–36)
MCV RBC AUTO: 98.4 FL — SIGNIFICANT CHANGE UP (ref 80–100)
NRBC # BLD: 0 /100 WBCS — SIGNIFICANT CHANGE UP (ref 0–0)
PLATELET # BLD AUTO: 248 K/UL — SIGNIFICANT CHANGE UP (ref 150–400)
POTASSIUM SERPL-MCNC: 4.3 MMOL/L — SIGNIFICANT CHANGE UP (ref 3.5–5.3)
POTASSIUM SERPL-SCNC: 4.3 MMOL/L — SIGNIFICANT CHANGE UP (ref 3.5–5.3)
RBC # BLD: 3.72 M/UL — LOW (ref 4.2–5.8)
RBC # FLD: 12.5 % — SIGNIFICANT CHANGE UP (ref 10.3–14.5)
SODIUM SERPL-SCNC: 137 MMOL/L — SIGNIFICANT CHANGE UP (ref 135–145)
WBC # BLD: 18.76 K/UL — HIGH (ref 3.8–10.5)
WBC # FLD AUTO: 18.76 K/UL — HIGH (ref 3.8–10.5)

## 2022-02-18 PROCEDURE — U0005: CPT

## 2022-02-18 PROCEDURE — C1776: CPT

## 2022-02-18 PROCEDURE — 94760 N-INVAS EAR/PLS OXIMETRY 1: CPT

## 2022-02-18 PROCEDURE — C1713: CPT

## 2022-02-18 PROCEDURE — 97165 OT EVAL LOW COMPLEX 30 MIN: CPT

## 2022-02-18 PROCEDURE — 88311 DECALCIFY TISSUE: CPT

## 2022-02-18 PROCEDURE — 36415 COLL VENOUS BLD VENIPUNCTURE: CPT

## 2022-02-18 PROCEDURE — 27447 TOTAL KNEE ARTHROPLASTY: CPT

## 2022-02-18 PROCEDURE — 73560 X-RAY EXAM OF KNEE 1 OR 2: CPT

## 2022-02-18 PROCEDURE — 88305 TISSUE EXAM BY PATHOLOGIST: CPT

## 2022-02-18 PROCEDURE — 97110 THERAPEUTIC EXERCISES: CPT

## 2022-02-18 PROCEDURE — 99239 HOSP IP/OBS DSCHRG MGMT >30: CPT

## 2022-02-18 PROCEDURE — 94660 CPAP INITIATION&MGMT: CPT

## 2022-02-18 PROCEDURE — 97116 GAIT TRAINING THERAPY: CPT

## 2022-02-18 PROCEDURE — U0003: CPT

## 2022-02-18 PROCEDURE — 97161 PT EVAL LOW COMPLEX 20 MIN: CPT

## 2022-02-18 PROCEDURE — 97535 SELF CARE MNGMENT TRAINING: CPT

## 2022-02-18 PROCEDURE — 83735 ASSAY OF MAGNESIUM: CPT

## 2022-02-18 PROCEDURE — 80048 BASIC METABOLIC PNL TOTAL CA: CPT

## 2022-02-18 PROCEDURE — 97530 THERAPEUTIC ACTIVITIES: CPT

## 2022-02-18 PROCEDURE — C1889: CPT

## 2022-02-18 PROCEDURE — 85027 COMPLETE CBC AUTOMATED: CPT

## 2022-02-18 RX ORDER — OXYCODONE HYDROCHLORIDE 5 MG/1
1 TABLET ORAL
Qty: 42 | Refills: 0
Start: 2022-02-18 | End: 2022-02-24

## 2022-02-18 RX ORDER — ASPIRIN/CALCIUM CARB/MAGNESIUM 324 MG
1 TABLET ORAL
Qty: 56 | Refills: 0
Start: 2022-02-18 | End: 2022-03-17

## 2022-02-18 RX ORDER — CELECOXIB 200 MG/1
1 CAPSULE ORAL
Qty: 60 | Refills: 0
Start: 2022-02-18 | End: 2022-03-19

## 2022-02-18 RX ORDER — APIXABAN 2.5 MG/1
1 TABLET, FILM COATED ORAL
Qty: 1 | Refills: 0
Start: 2022-02-18

## 2022-02-18 RX ORDER — ACETAMINOPHEN 500 MG
2 TABLET ORAL
Qty: 0 | Refills: 0 | DISCHARGE
Start: 2022-02-18

## 2022-02-18 RX ORDER — POLYETHYLENE GLYCOL 3350 17 G/17G
17 POWDER, FOR SOLUTION ORAL
Qty: 0 | Refills: 0 | DISCHARGE
Start: 2022-02-18

## 2022-02-18 RX ORDER — APIXABAN 2.5 MG/1
1 TABLET, FILM COATED ORAL
Qty: 22 | Refills: 0
Start: 2022-02-18 | End: 2022-02-28

## 2022-02-18 RX ORDER — SENNA PLUS 8.6 MG/1
2 TABLET ORAL
Qty: 0 | Refills: 0 | DISCHARGE
Start: 2022-02-18

## 2022-02-18 RX ORDER — OMEPRAZOLE 10 MG/1
1 CAPSULE, DELAYED RELEASE ORAL
Qty: 30 | Refills: 0
Start: 2022-02-18 | End: 2022-03-19

## 2022-02-18 RX ADMIN — Medication 1000 MILLIGRAM(S): at 01:43

## 2022-02-18 RX ADMIN — Medication 1000 MILLIGRAM(S): at 15:12

## 2022-02-18 RX ADMIN — PANTOPRAZOLE SODIUM 40 MILLIGRAM(S): 20 TABLET, DELAYED RELEASE ORAL at 06:19

## 2022-02-18 RX ADMIN — CELECOXIB 200 MILLIGRAM(S): 200 CAPSULE ORAL at 10:06

## 2022-02-18 RX ADMIN — APIXABAN 2.5 MILLIGRAM(S): 2.5 TABLET, FILM COATED ORAL at 10:06

## 2022-02-18 RX ADMIN — Medication 400 MILLIGRAM(S): at 01:20

## 2022-02-18 RX ADMIN — Medication 101.6 MILLIGRAM(S): at 06:19

## 2022-02-18 RX ADMIN — Medication 1000 MILLIGRAM(S): at 15:11

## 2022-02-18 NOTE — OCCUPATIONAL THERAPY INITIAL EVALUATION ADULT - LIVES WITH, PROFILE
Pt lives with his wife and daughter in a private home, 4 steps to enter with 13 steps inside, can stay on the 1st floor if needed. Pt has both a tub and walk in shower at home./children/spouse

## 2022-02-18 NOTE — DISCHARGE NOTE NURSING/CASE MANAGEMENT/SOCIAL WORK - NSSCNAMETXT_GEN_ALL_CORE
[facility] [place] - [phone] or [phone]  Nurse to visit the day after hospital discharge; physical therapist to follow. Please contact the home care agency if you have not heard from them by 12 noon on the day after your hospital discharge.   [facility]-[phone]  Nurse to visit the day after hospital discharge; physical therapist to follow. Please contact the home care agency if you have not heard from them by 12 noon on the day after your hospital discharge.

## 2022-02-18 NOTE — PROGRESS NOTE ADULT - ASSESSMENT
pt with TKR - POD 1     OP  OA  Obesity  GERD  HTN    used CPAP last night -     uses CPAP night time for ALBERTO  CPAP night time - Sleep Hygiene - Weight management  post op care  Antacid  I michael  dvt p  pain rx regimen  caution with Opioids in pt with ALBERTO  Bowel rx regimen  PT eval  ortho f.u.  carlos planning  wound care
S/P R TKR    Aftercare following surgery  -WBAT  -PT/OT  -Early ambulation  -Pain management  -Incentive Spirometry  -DVT ppx as per ortho  - Leukocytosis post op, has been afebrile    HTN/HLD  Continue statin  BP meds per ortho protocol     Patient is medically stable for discharge with close outpatient follow up once cleared by PT and ortho

## 2022-02-18 NOTE — DISCHARGE NOTE NURSING/CASE MANAGEMENT/SOCIAL WORK - PATIENT PORTAL LINK FT
You can access the FollowMyHealth Patient Portal offered by [facility] by registering at the following website: [email or web address]. By joining [employer]’s FollowMyHealth portal, you will also be able to view your health information using other applications (apps) compatible with our system.

## 2022-02-18 NOTE — OCCUPATIONAL THERAPY INITIAL EVALUATION ADULT - LIGHT TOUCH SENSATION, LLE, REHAB EVAL
Normal rate, regular rhythm.  Heart sounds S1, S2.  No murmurs, rubs or gallops. no chest wall tenderness
within normal limits

## 2022-02-18 NOTE — OCCUPATIONAL THERAPY INITIAL EVALUATION ADULT - ANTICIPATED DISCHARGE DISPOSITION, OT EVAL
Recommend supervision/assist for functional activities prn which pt states wife and daughter will provide./no needs

## 2022-02-18 NOTE — PROGRESS NOTE ADULT - SUBJECTIVE AND OBJECTIVE BOX
Patient is a 66y old  Male who presents with a chief complaint of right total knee replacement (17 Feb 2022 15:37)      INTERVAL HPI/OVERNIGHT EVENTS: Patient seen and examined at bedside. No overnight events.      MEDICATIONS  (STANDING):  acetaminophen     Tablet .. 1000 milliGRAM(s) Oral every 8 hours  apixaban 2.5 milliGRAM(s) Oral every 12 hours  atorvastatin 40 milliGRAM(s) Oral at bedtime  celecoxib 200 milliGRAM(s) Oral every 12 hours  lactated ringers. 1000 milliLiter(s) (125 mL/Hr) IV Continuous <Continuous>  nebivolol 10 milliGRAM(s) Oral daily  pantoprazole    Tablet 40 milliGRAM(s) Oral before breakfast  polyethylene glycol 3350 17 Gram(s) Oral at bedtime  senna 2 Tablet(s) Oral at bedtime    MEDICATIONS  (PRN):  HYDROmorphone  Injectable 0.5 milliGRAM(s) IV Push every 3 hours PRN breakthrough pain  magnesium hydroxide Suspension 30 milliLiter(s) Oral daily PRN Constipation  ondansetron Injectable 4 milliGRAM(s) IV Push every 6 hours PRN Nausea and/or Vomiting  oxyCODONE    IR 5 milliGRAM(s) Oral every 3 hours PRN Moderate Pain (4 - 6)  oxyCODONE    IR 10 milliGRAM(s) Oral every 3 hours PRN Severe Pain (7 - 10)      Allergies    No Known Drug Allergies  Seafood (Hives; Rash)    Intolerances        REVIEW OF SYSTEMS:  CONSTITUTIONAL: No fever or chills  HEENT:  No headache, no sore throat  RESPIRATORY: No cough, wheezing, or shortness of breath  CARDIOVASCULAR: No chest pain, palpitations, or leg swelling  GASTROINTESTINAL: No abd pain, nausea, vomiting, or diarrhea  GENITOURINARY: No dysuria, frequency, or hematuria  NEUROLOGICAL: no focal weakness or dizziness  MUSCULOSKELETAL: no myalgias     Vital Signs Last 24 Hrs  T(C): 36.6 (18 Feb 2022 07:46), Max: 36.9 (18 Feb 2022 03:30)  T(F): 97.9 (18 Feb 2022 07:46), Max: 98.5 (18 Feb 2022 03:30)  HR: 58 (18 Feb 2022 07:46) (54 - 87)  BP: 116/59 (18 Feb 2022 07:46) (101/53 - 140/73)  BP(mean): --  RR: 18 (18 Feb 2022 07:46) (12 - 22)  SpO2: 94% (18 Feb 2022 07:46) (94% - 99%)    PHYSICAL EXAM:  GENERAL: NAD  HEENT:  EOMI, moist mucous membranes  CHEST/LUNG:  CTA b/l, no rales, wheezes, or rhonchi  HEART:  RRR, S1, S2  ABDOMEN:  BS+, soft, nontender, nondistended  EXTREMITIES: dressing c/d/i no edema, cyanosis, or calf tenderness  NERVOUS SYSTEM: AA&Ox3    LABS:                        11.9   18.76 )-----------( 248      ( 18 Feb 2022 06:44 )             36.6     CBC Full  -  ( 18 Feb 2022 06:44 )  WBC Count : 18.76 K/uL  Hemoglobin : 11.9 g/dL  Hematocrit : 36.6 %  Platelet Count - Automated : 248 K/uL  Mean Cell Volume : 98.4 fl  Mean Cell Hemoglobin : 32.0 pg  Mean Cell Hemoglobin Concentration : 32.5 gm/dL  Auto Neutrophil # : x  Auto Lymphocyte # : x  Auto Monocyte # : x  Auto Eosinophil # : x  Auto Basophil # : x  Auto Neutrophil % : x  Auto Lymphocyte % : x  Auto Monocyte % : x  Auto Eosinophil % : x  Auto Basophil % : x    18 Feb 2022 06:44    137    |  103    |  15     ----------------------------<  123    4.3     |  24     |  1.11     Ca    8.3        18 Feb 2022 06:44  Mg     2.0       18 Feb 2022 07:47          CAPILLARY BLOOD GLUCOSE              RADIOLOGY & ADDITIONAL TESTS:    Consultant(s) Notes Reviewed:  [x] YES  [ ] NO    Care Discussed with [x] Consultants  [x] Patient  [ ] Family  [ ]      [ x] Other; RN  DVT ppx  
Post Op     REESE MENDEZ      66y        Male                                                                                                                 T(C): 36.6 (02-18-22 @ 07:46), Max: 36.9 (02-17-22 @ 09:16)  HR: 58 (02-18-22 @ 07:46) (54 - 87)  BP: 116/59 (02-18-22 @ 07:46) (101/53 - 140/73)  RR: 18 (02-18-22 @ 07:46) (12 - 22)  SpO2: 94% (02-18-22 @ 07:46) (94% - 100%)  Wt(kg): --    S/P   total knee replacement    Patient denies shortness of breath, chest pain, dyspnea, No complaints  Pain is 3 /10    Physical Exam    Extremity: Bilaterally:  No holmon                                           No Cord                                          PAS on                                          Neurovascular intact                                          Motor intact EHL/FHL                                          Sensation intact                                          Pulses intact DP/PT                                         Calves Soft                                         Dressing Clean / Dry / Intact prineo                                         Capillary refill with 5 seconds                          11.9   18.76 )-----------( 248      ( 18 Feb 2022 06:44 )             36.6       02-18    137  |  103  |  15  ----------------------------<  123<H>  4.3   |  24  |  1.11    Ca    8.3<L>      18 Feb 2022 06:44  Mg     2.0     02-18        A/P  -- S/P total knee replacement    -  Medicine To Follow   - DVT prophylaxis PAS eliquis  - PT & OT   - Analagesia  - Incentive Spirometry  - Discharge Planning  - Safety Precautions  -  CBC , BMP daily          
REESE MENDEZ                                                                [id number]                                                       Allergies--- No Known Drug Allergies  Seafood (Hives; Rash)        Pt is a 66y year old Male s/p right TKR.  Pt is A&O x 3, resting comforably, with no complaints.   Pain is 1/10.   Denies chest pain / shortness of breath / dyspnea / nausea / vomiting / headaches or light headed ness.          T(C): 36.6 (02-17-22 @ 14:18), Max: 36.9 (02-17-22 @ 09:16)  HR: 69 (02-17-22 @ 15:30) (63 - 82)  BP: 117/69 (02-17-22 @ 15:30) (115/66 - 139/49)  RR: 12 (02-17-22 @ 15:30) (12 - 20)  SpO2: 97% (02-17-22 @ 15:30) (94% - 100%)  Wt(kg): --    I&O's Detail    17 Feb 2022 07:01  -  17 Feb 2022 15:37  --------------------------------------------------------  IN:    Lactated Ringers: 1200 mL    Sodium Chloride 0.9% Bolus: 500 mL  Total IN: 1700 mL    OUT:    Blood Loss (mL): 200 mL  Total OUT: 200 mL    Total NET: 1500 mL        PE:   Right Lower Extremity:   Dressing/Ace bandage is C/D/I.   Neurovascularly intact.   No gross evidence of motor or sensory deficits.   +2 DP/PT pulses.   EHL/FHL/TA intact.   Toes are pink and mobile.   Capillary refill < 2 seconds.   PAS in place.         A:   Pt is a 66y year old Male S/P right total knee replacement, Post Op Day #0        Plan:    - Follow up with Medicine    - OOB with PT/OT   - Pain control    - Continue antibiotics as per SCIP protocol   - Incentive spirometry   - PAS stockings   - Following Lab results including Labs in A.M.   - Close monitoring   - DVT ppx =                                                                                                                                                                            Carlos BEASLEY      
Date/Time Patient Seen:  		  Referring MD:   Data Reviewed	       Patient is a 66y old  Male who presents with a chief complaint of right total knee replacement (17 Feb 2022 15:37)      Subjective/HPI     PAST MEDICAL & SURGICAL HISTORY:  HTN (hypertension)    HLD (hyperlipidemia)    Osteoarthritis  right knee    ALBERTO on CPAP    2019 novel coronavirus disease (COVID-19)  November 2021, mild symptoms and no hospitalization    COVID-19 vaccine series completed    Childhood asthma    GERD (gastroesophageal reflux disease)  diet controlled    H/O repair of left rotator cuff  2016    S/P right knee arthroscopy  2010          Medication list         MEDICATIONS  (STANDING):  acetaminophen     Tablet .. 1000 milliGRAM(s) Oral every 8 hours  apixaban 2.5 milliGRAM(s) Oral every 12 hours  atorvastatin 40 milliGRAM(s) Oral at bedtime  celecoxib 200 milliGRAM(s) Oral every 12 hours  lactated ringers. 1000 milliLiter(s) (125 mL/Hr) IV Continuous <Continuous>  nebivolol 10 milliGRAM(s) Oral daily  pantoprazole    Tablet 40 milliGRAM(s) Oral before breakfast  polyethylene glycol 3350 17 Gram(s) Oral at bedtime  senna 2 Tablet(s) Oral at bedtime    MEDICATIONS  (PRN):  HYDROmorphone  Injectable 0.5 milliGRAM(s) IV Push every 3 hours PRN breakthrough pain  magnesium hydroxide Suspension 30 milliLiter(s) Oral daily PRN Constipation  ondansetron Injectable 4 milliGRAM(s) IV Push every 6 hours PRN Nausea and/or Vomiting  oxyCODONE    IR 5 milliGRAM(s) Oral every 3 hours PRN Moderate Pain (4 - 6)  oxyCODONE    IR 10 milliGRAM(s) Oral every 3 hours PRN Severe Pain (7 - 10)         Vitals log        ICU Vital Signs Last 24 Hrs  T(C): 36.9 (18 Feb 2022 03:30), Max: 36.9 (17 Feb 2022 09:16)  T(F): 98.5 (18 Feb 2022 03:30), Max: 98.5 (17 Feb 2022 09:16)  HR: 65 (18 Feb 2022 05:12) (54 - 87)  BP: 110/62 (18 Feb 2022 04:48) (101/53 - 140/73)  BP(mean): --  ABP: --  ABP(mean): --  RR: 16 (18 Feb 2022 03:30) (12 - 22)  SpO2: 98% (18 Feb 2022 05:12) (94% - 100%)           Input and Output:  I&O's Detail    17 Feb 2022 07:01  -  18 Feb 2022 06:37  --------------------------------------------------------  IN:    Lactated Ringers: 1500 mL    Lactated Ringers: 1000 mL    Oral Fluid: 700 mL    Sodium Chloride 0.9% Bolus: 500 mL  Total IN: 3700 mL    OUT:    Blood Loss (mL): 200 mL    Voided (mL): 300 mL  Total OUT: 500 mL    Total NET: 3200 mL          Lab Data                        13.2   14.61 )-----------( 265      ( 17 Feb 2022 18:18 )             41.0     02-17    135  |  100  |  16  ----------------------------<  164<H>  4.2   |  23  |  1.32<H>    Ca    8.3<L>      17 Feb 2022 18:18              Review of Systems	      Objective     Physical Examination    heart s1s2  lung dec BS  abd soft      Pertinent Lab findings & Imaging      Buffy:  NO   Adequate UO     I&O's Detail    17 Feb 2022 07:01  -  18 Feb 2022 06:37  --------------------------------------------------------  IN:    Lactated Ringers: 1500 mL    Lactated Ringers: 1000 mL    Oral Fluid: 700 mL    Sodium Chloride 0.9% Bolus: 500 mL  Total IN: 3700 mL    OUT:    Blood Loss (mL): 200 mL    Voided (mL): 300 mL  Total OUT: 500 mL    Total NET: 3200 mL               Discussed with:     Cultures:	        Radiology

## 2022-02-18 NOTE — DISCHARGE NOTE NURSING/CASE MANAGEMENT/SOCIAL WORK - NSDCPEFALRISK_GEN_ALL_CORE
For information on Fall & Injury Prevention, visit: [email or web address] OR  [email or web address] OR  https://www.cdc.gov/steadi/patient.html

## 2022-02-18 NOTE — DISCHARGE NOTE NURSING/CASE MANAGEMENT/SOCIAL WORK - NSDCPEELIQUISREACT_GEN_ALL_CORE
Interval History: Pt without new c/o's.  I have reviewed the HPI and PMFSH and there are no changes from admission.        Scheduled Medications:    allopurinol  300 mg Oral Daily    amlodipine  10 mg Oral Daily    aspirin  81 mg Oral Daily    atorvastatin  10 mg Oral Daily    buPROPion  300 mg Oral Daily    calcium-vitamin D3  1 tablet Oral BID    ceFEPime (MAXIPIME) IVPB  2 g Intravenous Q12H    docusate sodium  100 mg Oral BID    dofetilide  250 mcg Oral Q12H    folic acid  1 mg Oral Daily    hydrALAZINE  75 mg Oral Q8H    lisinopril  10 mg Oral BID    magnesium oxide  400 mg Oral BID    multivitamin  1 tablet Oral Daily    ropinirole  0.5 mg Oral TID    sodium chloride 0.9%  10 mL Intravenous Q6H    sodium chloride 0.9%  3 mL Intravenous Q8H    spironolactone  25 mg Oral Daily    tamsulosin  0.4 mg Oral Daily    warfarin  4 mg Oral Every Tues, Thurs, Sat    warfarin  4 mg Oral Every Sun    warfarin  6 mg Oral Every Mon, Wed, Fri       PRN Medications: acetaminophen, bisacodyl, diphenoxylate-atropine 2.5-0.025 mg, lactulose, magnesium hydroxide 400 mg/5 ml, ondansetron, polyethylene glycol, Flushing PICC Protocol **AND** sodium chloride 0.9% **AND** sodium chloride 0.9%, sodium phosphates, trazodone    Review of Systems   Constitutional: Negative for appetite change and fatigue.   Eyes: Negative for visual disturbance.   Respiratory: Negative for shortness of breath.    Cardiovascular: Negative for chest pain.   Gastrointestinal: Negative for constipation and diarrhea.   Genitourinary: Negative for dysuria, frequency and urgency.   Musculoskeletal: Positive for gait problem. Negative for arthralgias, back pain, joint swelling, myalgias, neck pain and neck stiffness.   Neurological: Positive for tremors and weakness. Negative for dizziness, numbness and headaches.   Psychiatric/Behavioral: Negative for dysphoric mood.   All other systems reviewed and are negative.    Objective:     Vital  Signs (Most Recent):  Temp: 98.7 °F (37.1 °C) (08/14/17 0700)  Pulse: 69 (08/14/17 0700)  Resp: 18 (08/14/17 0700)  BP: (!) 86/0 (08/14/17 0700)  SpO2: 95 % (08/14/17 0700)    Vital Signs (24h Range):  Temp:  [98 °F (36.7 °C)-98.7 °F (37.1 °C)] 98.7 °F (37.1 °C)  Pulse:  [64-69] 69  Resp:  [18] 18  SpO2:  [95 %-96 %] 95 %  BP: (82-86)/(0) 86/0     Physical Exam   Constitutional: He is oriented to person, place, and time. He appears well-nourished.   Eyes: EOM are normal. Pupils are equal, round, and reactive to light.   Neck: Normal range of motion. Neck supple.   Cardiovascular: Normal rate.    Pulmonary/Chest: Effort normal.   Musculoskeletal: Normal range of motion.   Neurological: He is oriented to person, place, and time. He has normal strength.   Skin: No rash noted.   Psychiatric: He has a normal mood and affect.     NEUROLOGICAL EXAMINATION:     MENTAL STATUS   Oriented to person, place, and time.     CRANIAL NERVES     CN III, IV, VI   Pupils are equal, round, and reactive to light.  Extraocular motions are normal.     MOTOR EXAM     Strength   Strength 5/5 throughout.      Apixaban/Eliquis increases your risk for bleeding. Notify your doctor if you experience any of the following side effects: bleeding, coughing or vomiting blood, red or black stool, unexpected pain or swelling, itching or hives, chest pain, chest tightness, trouble breathing, changes in how much or how often you urinate, red or pink urine, numbness or tingling in your feet, or unusual muscle weakness. When Apixaban/Eliquis is taken with other medicines, they can affect how it works. Taking other medications such as aspirin, blood thinners, nonsteroidal anti-inflammatories, and medications that treat depression can increase your risk of bleeding. It is very important to tell your health care provider about all of the other medicines, including over-the-counter medications, herbs, and vitamins you are taking. DO NOT start, stop, or change the dosage of any medicine, including over-the-counter medicines, vitamins, and herbal products without your doctor’s approval. Any products containing aspirin or are nonsteroidal anti-inflammatories lessen the blood’s ability to form clots and add to the effect of Apixaban/Eliquis. Never take aspirin or medicines that contain aspirin without speaking to your doctor.

## 2022-03-04 ENCOUNTER — APPOINTMENT (OUTPATIENT)
Dept: ORTHOPEDIC SURGERY | Facility: CLINIC | Age: 67
End: 2022-03-04
Payer: MEDICARE

## 2022-03-04 VITALS
BODY MASS INDEX: 44.88 KG/M2 | HEIGHT: 69 IN | HEART RATE: 60 BPM | SYSTOLIC BLOOD PRESSURE: 152 MMHG | WEIGHT: 303 LBS | DIASTOLIC BLOOD PRESSURE: 78 MMHG

## 2022-03-04 DIAGNOSIS — Z96.651 AFTERCARE FOLLOWING JOINT REPLACEMENT SURGERY: ICD-10-CM

## 2022-03-04 DIAGNOSIS — Z96.651 PRESENCE OF RIGHT ARTIFICIAL KNEE JOINT: ICD-10-CM

## 2022-03-04 DIAGNOSIS — Z47.1 AFTERCARE FOLLOWING JOINT REPLACEMENT SURGERY: ICD-10-CM

## 2022-03-04 PROCEDURE — 73562 X-RAY EXAM OF KNEE 3: CPT | Mod: RT

## 2022-03-04 PROCEDURE — 99024 POSTOP FOLLOW-UP VISIT: CPT

## 2022-03-04 RX ORDER — AMOXICILLIN 500 MG/1
500 TABLET, FILM COATED ORAL
Qty: 20 | Refills: 0 | Status: ACTIVE | COMMUNITY
Start: 2022-03-04 | End: 1900-01-01

## 2022-03-04 NOTE — HISTORY OF PRESENT ILLNESS
[de-identified] : s/p right TKR [de-identified] : Patient presents today with her daughter for evaluation of right total knee arthroplasty. The patient is approximately 2 weeks of surgery. He is doing well. He has well more home PT session and will transition to outpatient physical therapy. He is only taking Tylenol for pain control. He is no longer taking narcotic pain medicine nor Celebrex for pain control. He continues aspirin for DVT prophylaxis. His transition to using a cane. He denies any postoperative complications. [de-identified] : Postop total knee replacement exam\par \par Exam of the right knee reveals that the patient is post knee replacement surgery.  The incision is clean, dry, and intact with prineo tape closure noted. No wound dehisence noted. There is no erythema.   There is mild effusion. No calf tenderness. No venous stasis or open skin wounds distally. No foot drop. Sensation is intact to light touch. \par \par 1- Alignment: measured on AP standing Xray Anatomic Alignment\par Neutral \par \par 2- Medial / Lateral Instability: measured in full extension \par None \par \par 3- Anterior / Posterior Instability: measured at 90 degrees \par None \par \par 4 - Range of motion is from zero to 110 degrees. \par \par 5- Flexion Contracture no\par \par 6- Extensor Lag Minus Points no\par  [de-identified] : X-ray of the right knee was reviewed. Implants are in good position. No signs of loosening or fracture. [de-identified] : Status post right total knee arthroplasty 2 weeks [de-identified] : X-rays reviewed with the patient. The patient was advised to continue their routine activities of daily living within tolerances , home exercises as guided by P.T., and continue outpatient PT till goals are achieved. A prescription was given for continued out-patient  PT modalities, strengthening, and  ROM. The patient was advised to continue with antibiotic prophylaxis for dental procedures and regular interval orthopedic follow-up post total knee arthroplasty. The patient will continue the current DVT prophylaxis plan. They may contact our office if any new problems arise for urgent orthopedic re-evaluation. Patient will be seen back in 6 weeks for reevaluation.\par

## 2022-03-22 ENCOUNTER — APPOINTMENT (OUTPATIENT)
Dept: ORTHOPEDIC SURGERY | Facility: CLINIC | Age: 67
End: 2022-03-22

## 2022-04-22 ENCOUNTER — APPOINTMENT (OUTPATIENT)
Dept: ORTHOPEDIC SURGERY | Facility: CLINIC | Age: 67
End: 2022-04-22
Payer: MEDICARE

## 2022-04-22 VITALS — BODY MASS INDEX: 44.88 KG/M2 | WEIGHT: 303 LBS | HEIGHT: 69 IN

## 2022-04-22 PROCEDURE — 73562 X-RAY EXAM OF KNEE 3: CPT | Mod: RT

## 2022-04-22 PROCEDURE — 99024 POSTOP FOLLOW-UP VISIT: CPT

## 2022-04-22 NOTE — HISTORY OF PRESENT ILLNESS
[Clean/Dry/Intact] : clean, dry and intact [Healed] : healed [Vascular Intact] : ~T peripheral vascular exam normal [Neuro Intact] : an unremarkable neurological exam [Negative Ovi's] : maneuvers demonstrated a negative Ovi's sign [Doing Well] : is doing well [Excellent Pain Control] : has excellent pain control [No Sign of Infection] : is showing no signs of infection [Erythema] : not erythematous [Discharge] : absent of discharge [Swelling] : not swollen [Dehiscence] : not dehisced [de-identified] : s/p right TKR 2/17/22 [de-identified] : Pt is a 66 year old male, s/p right total knee replacement. He is doing well and returning back to his normal activities. Overall doing good. Denies pain today, states he has full ROM. Has been going to PT as well as home pt.  [de-identified] : PE. There is a well healed surgical scar. No evidence of infection, superficial or deep. Great ROM. normal alignment. No motor or sensory deficit. No muscle atrophy. nvi. no calf tenderness [de-identified] : x-rays of the right knee. S/P right total knee replacement. The prosthesis is in place. Great alignment and fixation. [de-identified] : s/p right TKR 2/17/22 [de-identified] : Plan is to continue with his current activities. This includes PT, home exercises, ice/heat and NSAIDs. He is advised to return to the office in 1 year. However, if he feels any increase pain/discomfort. He should return earlier.\par \par I saw and evaluated the patient. I discussed and reviewed the case details with the PA and agree with the PA's findings and plan as documented in the PA note.\par \par

## 2022-06-14 ENCOUNTER — RESULT REVIEW (OUTPATIENT)
Age: 67
End: 2022-06-14

## 2022-06-15 NOTE — H&P PST ADULT - FUNCTIONAL ASSESSMENT - DAILY ACTIVITY 6.
Last ov 1/21/22    Received request via: Pharmacy    Was the patient seen in the last year in this department? Yes    Does the patient have an active prescription (recently filled or refills available) for medication(s) requested? No     4 = No assist / stand by assistance

## 2023-08-01 NOTE — H&P PST ADULT - GASTROINTESTINAL DETAILS
Yes, can be stay on adderall but I am still not sure insurance will pay for TID dosing (which is reason that vyvanse was started). I can prescribe bid. Is that what he'd like or is he planning to pay out of pocket?   normal/soft/nontender/bowel sounds normal

## 2025-03-06 NOTE — CONSULT NOTE ADULT - SUBJECTIVE AND OBJECTIVE BOX
Date/Time Patient Seen:  		  Referring MD:   Data Reviewed	       Patient is a 66y old  Male who presents with a chief complaint of right total knee replacement (17 Feb 2022 15:37)      Subjective/HPI  vs noted  labs reviewed  imaging reviewed  H and P reviewed  known ALBERTO  uses CPAP at home  verbal  alert  oriented  PAST SURGICAL HISTORY:  H/O repair of left rotator cuff 2016    S/P right knee arthroscopy 2010.     FAMILY HISTORY:  Father  Still living? No  Family history of heart attack, Age at diagnosis: Age Unknown  FH: congestive heart failure, Age at diagnosis: Age Unknown    Mother  Still living? Yes, Estimated age: Age Unknown  FH: HTN (hypertension), Age at diagnosis: Age Unknown  FH: type 2 diabetes mellitus, Age at diagnosis: Age Unknown.     COVID-19 Vaccine Assessment:  · History of COVID-19 vaccination	Yes  · Brand of COVID-19 vaccination	Pfizer dose 1 and 2  · Date of last vaccination	06-Apr-2021  · Have you had a COVID-19 booster?	No  · Will the patient accept the COVID-19 vaccine if eligible and it is available?	Yes     Social History:  · Marital Status	  · Occupation	Retired  · Lives With	children; spouse     Substance Use History:  · Substance Use	caffeine  denied illicit drug use  · Caffeine Type	coffee; tea  · Caffeine Amount/Frequency	1-2 cups/cans per day     Alcohol Use History:  · Have you ever consumed alcohol	yes...  · Alcohol Type	wine  · Alcohol Frequency	monthly or less  · Alcohol Amount	1-2 drinks  · Problems Related to Alcohol Use	no  · 1. Have you felt you ought to CUT down on your drinking?	no  · 2. Have people ANNOYED you by criticizing your drinking?	no  · 3. Have you ever felt bad or GUILTY about your drinking?	no  · 4. Have you ever needed an "EYE OPENER", a drink first thing in the morning to steady your nerves or get rid of a hangover?	no     Tobacco Usage:  · Tobacco Usage: Never smoker     Passive Smoke Exposure:  · Passive Smoke Exposure	No    Presurgical Screening:    Cardiovascular:  · Activity	walk daily  · Energy expenditure (mets)	5 mets  · Symptoms	none     Cardiac Tests:  · Last Echocardiogram	2021  · Last Stress Test	2021  · Last Cardiac Angiogram/Imaging	none     Sleep Apnea Screening:  Confirmed Obstructive Sleep Apnea (ALBERTO)?: Yes  neck 20 inches  Treatment: CPAP  Contact information for sleep study results: [facility]     Airway:  · Mallampati Score	Class III - visualization of the soft palate and the base of the uvula  · Dentition	normal     Anesthesia History:  · Previous Reaction to Anesthesia	none     Transfusion History:  · Blood Avoidance/Restrictions	none  · Previous Blood Transfusion	no    Core Measures/Disease Management:    Heart Failure:  Does this patient have a history of or has been diagnosed with heart failure? no.    Clinical Risk Assessment:    Sepsis:  · Does patient meet criteria for Sepsis	No     Catheterizations/Incisions/Drainage:  · Urinary Catheter	no  · Central Venous Catheter/PICC Line	no  · Surgical Site Incision	no  · Venous Thromboembolism	no  · Pulmonary Embolus	no  · Pressure Injury	no      VTE Risk Factor Assessment:   · Age	(2) age 61-74 years  · BMI	(1) obesity (BMI greater than 25)  · History	(0) indicator not present  · Current Status	(1) other risk factor (includes escalating BMI, pack-years of smoking, diabetes requiring insulin, chemotherapy, female gender and length of surgery)  · Current Labs/Test Results	none  · For Women Only	(0) indicator not present  · VTE Score	4    Pre-Op Diagnosis, Post-Op Diagnosis and Procedure:    Pre-Op, Post-Op and Procedure Selector:  ·  PRE-OP DIAGNOSIS:  Osteoarthritis of right knee 17-Feb-2022 14:17:25  Oscar Shields.  ·  POST-OP DIAGNOSIS:  Osteoarthritis of right knee 17-Feb-2022 14:18:01  Oscar Shields.  ·  PROCEDURES:  Right total knee replacement 17-Feb-2022 14:17:13  Oscar Shields.     Hospital Course:  Admission Date	17-Feb-2022 08:43  Reason for Admission	Right TKR  Hospital Course	  This patient was admitted to [facility] with a history of severe degenerative joint disease of the right knee  Patient went to Pre-Surgical Testing at [facility] and was medically cleared to undergo elective procedure. Patient underwent Right TKR by  on 2/17/2022.  No operative or neelam-operative complications arose during patients hospital course.  Patient received antibiotic according to SCIP guidelines for infection prevention.  Eliquis was given for DVT prophylaxis.  Anesthesia, Medical Hospitalist, Physical Therapy and Occupational Therapy were consulted. Patient is stable for discharge with a good prognosis.  Appropriate discharge instructions and medications are provided in this document.       PAST MEDICAL & SURGICAL HISTORY:  HTN (hypertension)    HLD (hyperlipidemia)    Osteoarthritis  right knee    ALBERTO on CPAP    2019 novel coronavirus disease (COVID-19)  November 2021, mild symptoms and no hospitalization    COVID-19 vaccine series completed    Childhood asthma    GERD (gastroesophageal reflux disease)  diet controlled    H/O repair of left rotator cuff  2016    S/P right knee arthroscopy  2010          Medication list         MEDICATIONS  (STANDING):  lactated ringers. 1000 milliLiter(s) (75 mL/Hr) IV Continuous <Continuous>  sodium chloride 0.9% Bolus 500 milliLiter(s) IV Bolus once    MEDICATIONS  (PRN):  HYDROmorphone  Injectable 0.5 milliGRAM(s) IV Push every 10 minutes PRN Moderate Pain (4 - 6)  ondansetron Injectable 4 milliGRAM(s) IV Push once PRN Nausea and/or Vomiting         Vitals log        ICU Vital Signs Last 24 Hrs  T(C): 36.6 (17 Feb 2022 14:18), Max: 36.9 (17 Feb 2022 09:16)  T(F): 97.9 (17 Feb 2022 14:18), Max: 98.5 (17 Feb 2022 09:16)  HR: 72 (17 Feb 2022 17:00) (63 - 82)  BP: 129/55 (17 Feb 2022 17:00) (115/66 - 140/73)  BP(mean): --  ABP: --  ABP(mean): --  RR: 19 (17 Feb 2022 17:00) (12 - 20)  SpO2: 98% (17 Feb 2022 17:00) (94% - 100%)           Input and Output:  I&O's Detail    17 Feb 2022 07:01  -  17 Feb 2022 17:20  --------------------------------------------------------  IN:    Lactated Ringers: 1200 mL    Oral Fluid: 300 mL    Sodium Chloride 0.9% Bolus: 500 mL  Total IN: 2000 mL    OUT:    Blood Loss (mL): 200 mL  Total OUT: 200 mL    Total NET: 1800 mL          Lab Data                  Review of Systems	  post op      Objective     Physical Examination    obese  verbal  alert  head nc  head at  heart s1s2  lung dec BS      Pertinent Lab findings & Imaging      Baer:  NO   Adequate UO     I&O's Detail    17 Feb 2022 07:01  -  17 Feb 2022 17:20  --------------------------------------------------------  IN:    Lactated Ringers: 1200 mL    Oral Fluid: 300 mL    Sodium Chloride 0.9% Bolus: 500 mL  Total IN: 2000 mL    OUT:    Blood Loss (mL): 200 mL  Total OUT: 200 mL    Total NET: 1800 mL               Discussed with:     Cultures:	        Radiology      ACC: [id number] EXAM:  XR KNEE 1-2 VIEWS RT                          PROCEDURE DATE:  02/17/2022          INTERPRETATION:  Clinical History: 66-year-old male right TKR.    Two views of the right knee are compared to 3/13/2019 and demonstrate   that the patient is post total arthroplasty, prosthesis is in   satisfactory position.    Unremarkable soft tissue changes.    IMPRESSION:  Total arthroplasty, unremarkable postoperative views    --- End of Report ---            BRADEN CHAVIS DO; Attending Radiologist  This document has been electronically signed. Feb 17 2022  3:43PM                        
- As noted above, CT findings of compression deformites of T11, L1, L4, L5  - Pending XR LS for further evaluation  - Orthopedics following
Patient is a 66y old  Male who presents with a chief complaint of right total knee replacement (17 Feb 2022 15:37)      FROM ADMISSION H+P:   HPI: 65 y/o male who presents with complaints of right knee pain for the past 5 year. Reports constant achy pain and pain get worse with walking ,climbing up and down stairs. He received cortisone injections and "gel shots" in the past with no relief. Currently pain is 3/10 at rest and increased to 8-9/10 with activity. Denies using any current pain relief measures. Patient seen post op. Tolerated procedure well without complications. C/o mild pain at surgical site. Denies any new complaints.    ----  PAST MEDICAL & SURGICAL HISTORY:  HTN (hypertension)    HLD (hyperlipidemia)    Osteoarthritis  right knee    ALBERTO on CPAP    2019 novel coronavirus disease (COVID-19)  November 2021, mild symptoms and no hospitalization    COVID-19 vaccine series completed    Childhood asthma    GERD (gastroesophageal reflux disease)  diet controlled    H/O repair of left rotator cuff  2016    S/P right knee arthroscopy  2010        ----  Home medications:  atorvastatin 40 mg oral tablet: 1 tab(s) orally once a day (at bedtime) (17 Feb 2022 09:33)  Bystolic 10 mg oral tablet: 1 tab(s) orally once a day (at bedtime) (17 Feb 2022 09:33)    ----  FAMILY HISTORY:  FH: congestive heart failure (Father)    FH: type 2 diabetes mellitus (Mother)    Family history of heart attack (Father)    FH: HTN (hypertension) (Mother)        ----  Allergies    No Known Drug Allergies  Seafood (Hives; Rash)    Intolerances        ----  Social History:  Denies current alcohol, tobacco or drug use     ----  REVIEW OF SYSTEMS:  CONSTITUTIONAL: denies fever, chills, fatigue, weakness  HEENT: denies blurred vision, sore throat  SKIN: denies new lesions, rash  CARDIOVASCULAR: denies chest pain, chest pressure, palpitations  RESPIRATORY: denies shortness of breath, sputum production  GASTROINTESTINAL: denies nausea, vomiting, diarrhea, abdominal pain  GENITOURINARY: denies dysuria, discharge  NEUROLOGICAL: denies numbness, headache, focal weakness  MUSCULOSKELETAL: denies new joint pain, muscle aches  HEMATOLOGIC: denies gross bleeding, bruising    ----  PHYSICAL EXAM:  GENERAL: patient appears well, no acute distress, appropriately interactive  EYES: sclera clear, no exudates  LUNGS: good air entry bilaterally, clear to auscultation, symmetric breath sounds  HEART: soft S1/S2, regular rate and rhythm, no murmurs noted, no noted edema to bilateral lower extremities  GASTROINTESTINAL: abdomen is soft, nontender, nondistended, normoactive bowel sounds, no palpable masses  INTEGUMENT: good skin turgor, appropriate for ethnicity, appears well perfused, no jaundice noted  MUSCULOSKELETAL: dressing c/d/i. no clubbing or cyanosis, no obvious deformity  NEUROLOGIC: awake, alert, oriented x3, good muscle tone in 4 extremities, no obvious sensory deficits  PSYCHIATRIC: mood is good, affect is congruent with mood, linear and logical thought process    T(C): 36.6 (02-17-22 @ 14:18), Max: 36.9 (02-17-22 @ 09:16)  HR: 74 (02-17-22 @ 16:00) (63 - 82)  BP: 117/69 (02-17-22 @ 15:30) (115/66 - 139/49)  RR: 17 (02-17-22 @ 16:00) (12 - 20)  SpO2: 98% (02-17-22 @ 16:00) (94% - 100%)  Wt(kg): --    ----  INPATIENT MEDICATIONS  HYDROmorphone  Injectable 0.5 milliGRAM(s) IV Push every 10 minutes PRN  lactated ringers. 1000 milliLiter(s) IV Continuous <Continuous>  ondansetron Injectable 4 milliGRAM(s) IV Push once PRN      ----  I&O's Summary    17 Feb 2022 07:01  -  17 Feb 2022 16:39  --------------------------------------------------------  IN: 2000 mL / OUT: 200 mL / NET: 1800 mL        LABS:              CAPILLARY BLOOD GLUCOSE                    ----  Personally reviewed:  Vital sign trends: [ x ] yes    [  ] no     [  ] n/a  Laboratory results: [x  ] yes    [  ] no     [  ] n/a

## (undated) DEVICE — DRSG XEROFORM 1"

## (undated) DEVICE — SYM-STRYKER SYSTEM 7: Type: DURABLE MEDICAL EQUIPMENT

## (undated) DEVICE — DRSG COMBINE 5X9"

## (undated) DEVICE — NDL SPINAL 18G X 3.5"

## (undated) DEVICE — GOWN XL W TOWEL

## (undated) DEVICE — SEALER BIPOLAR 6.0 AQUAMANTYS

## (undated) DEVICE — PACK TOTAL KNEE

## (undated) DEVICE — SYR LUER LOK 20CC

## (undated) DEVICE — SOL IRR POUR NS 0.9% 500ML

## (undated) DEVICE — CUFF TOURNIQUET 34" DUAL PORT W PLC

## (undated) DEVICE — SUT VICRYL PLUS 2-0 27" CP-1 UNDYED

## (undated) DEVICE — SYR LUER LOK 50CC

## (undated) DEVICE — BLANKET WARMER UPPER ADULT

## (undated) DEVICE — SUT MONOSOF 3-0 30" C-16

## (undated) DEVICE — DRSG 4X4

## (undated) DEVICE — SOL IRR POUR H2O 1500ML

## (undated) DEVICE — SUT DERMABOND PRINEO 60CM

## (undated) DEVICE — CONTAINER SPECIMEN PET

## (undated) DEVICE — KNEEALIGN TIBIAL AND FEMORAL

## (undated) DEVICE — KIT OPTIVAC CEMENT MIXER 40GM

## (undated) DEVICE — IRRISEPT JET LAVAGE W 0.05 PCT CHG

## (undated) DEVICE — SOL IRR BAG NS 0.9% 3000ML

## (undated) DEVICE — SUT MONOCRYL 3-0 18" PS-1

## (undated) DEVICE — SOLIDIFIER CANN EXPRESS 3K

## (undated) DEVICE — HOOD FLYTE STRYKER HELMET SHIELD

## (undated) DEVICE — DEVICE ORTHALIGN PLUS

## (undated) DEVICE — WRAP COMPRESSION CALF MED

## (undated) DEVICE — SUT VICRYL PLUS 1 27" CP UNDYED

## (undated) DEVICE — ELCTR PENCIL NEPTUNE SMOKE EVACUATION